# Patient Record
Sex: FEMALE | Race: WHITE | Employment: OTHER | URBAN - METROPOLITAN AREA
[De-identification: names, ages, dates, MRNs, and addresses within clinical notes are randomized per-mention and may not be internally consistent; named-entity substitution may affect disease eponyms.]

---

## 2017-03-21 ENCOUNTER — OFFICE VISIT (OUTPATIENT)
Dept: DERMATOLOGY | Facility: AMBULATORY SURGERY CENTER | Age: 82
End: 2017-03-21

## 2017-03-21 VITALS
OXYGEN SATURATION: 99 % | BODY MASS INDEX: 20.16 KG/M2 | DIASTOLIC BLOOD PRESSURE: 80 MMHG | HEIGHT: 65 IN | WEIGHT: 121 LBS | RESPIRATION RATE: 16 BRPM | TEMPERATURE: 97.6 F | HEART RATE: 64 BPM | SYSTOLIC BLOOD PRESSURE: 108 MMHG

## 2017-03-21 DIAGNOSIS — L57.0 ACTINIC KERATOSES: Primary | ICD-10-CM

## 2017-03-21 DIAGNOSIS — L82.0 SEBORRHEIC KERATOSES, INFLAMED: ICD-10-CM

## 2017-03-21 DIAGNOSIS — D18.01 CHERRY ANGIOMA: ICD-10-CM

## 2017-03-21 DIAGNOSIS — L82.1 SEBORRHEIC KERATOSES: ICD-10-CM

## 2017-03-21 NOTE — PROGRESS NOTES
Ms. Kolton Rojas comes in for follow-up. She is an 51-year-old lady with a history of actinic keratoses. She notices a few raised rough areas, 2 on her right cheek for which she has applied triamcinolone appointment without improvement. She is feeling well and in her usual state of health today. She has no pain, no current illnesses, no other skin concerns. Her allergies medications medical and social history are reviewed by me today. I have reviewed the history, physical exam, assessment and plan [facility] and agree. She is awake alert 51-year-old lady who appears well. I examined her scalp face ears neck chest back abdomen upper and lower extremities. She has actinic keratoses right supra brow, left lateral canthus, left clavicle. She has thicker seborrheic keratoses on her right cheek and right temple, the lesions she had noted. She has seborrheic keratoses, several inflamed on her extremities. Each diagnosis was discussed with her. Actinic keratoses were discussed and treatment was advised, she requested treatment for only the right brow and left clavicle today, and this was performed. She will schedule follow-up in the next few weeks for treatment of her left lateral canthus and the larger seborrheic keratoses on her right cheek and temple.
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
## 2017-03-21 NOTE — MR AVS SNAPSHOT
Visit Information Date & Time Provider Department Dept. Phone Encounter #  
 3/21/2017 10:30 AM Cindy Mendez NP [place] [address] [phone] [id number] Upcoming Health Maintenance Date Due ZOSTER VACCINE AGE 60> 7/12/1992 GLAUCOMA SCREENING Q2Y 7/12/1997 MEDICARE YEARLY EXAM 7/12/1997 Pneumococcal 65+ High/Highest Risk (2 of 2 - PPSV23) 11/5/2007 DTaP/Tdap/Td series (1 - Tdap) 9/15/2016 Allergies as of 3/21/2017  Review Complete On: 3/21/2017 By: Eusebio Bolden LPN Severity Noted Reaction Type Reactions Lemon  12/27/2011    Unknown (comments) Headache and close throat Novocain [Procaine]  09/05/2011    Anaphylaxis Caused her to faint Current Immunizations  Reviewed on 11/15/2013 Name Date Influenza High Dose Vaccine PF 9/14/2016 Influenza Vaccine 11/14/2013 Influenza Vaccine Split 11/3/2011 Pneumococcal Vaccine (Unspecified Type) 11/5/2002 Td, Adsorbed PF 9/14/2016 Not reviewed this visit Vitals BP Pulse Temp Resp Height(growth percentile) Weight(growth percentile) 108/80 (BP 1 Location: Right arm, BP Patient Position: Sitting) 64 97.6 °F (36.4 °C) (Oral) 16 5' 5\" (1.651 m) 121 lb (54.9 kg) SpO2 BMI OB Status Smoking Status 99% 20.14 kg/m2 Hysterectomy Former Smoker Vitals History BMI and BSA Data Body Mass Index Body Surface Area  
 20.14 kg/m 2 1.59 m 2 Preferred Pharmacy Pharmacy Name Phone [address], [address] [phone] Your Updated Medication List  
  
   
This list is accurate as of: 3/21/17 11:19 AM.  Always use your most recent med list.  
  
  
  
  
 Biotin 2,500 mcg Cap Take  by mouth. CENTRUM SILVER PO Take  by mouth. CLARITIN-D 12 HOUR 5-120 mg per tablet Generic drug:  loratadine-pseudoephedrine Take 1 Tab by mouth two (2) times daily as needed. diazePAM 2 mg tablet Commonly known as:  VALIUM Take  by mouth every six (6) hours as needed for Anxiety. levothyroxine 25 mcg tablet Commonly known as:  SYNTHROID  
TAKE 1 TABLET BY MOUTH ONCE DAILY. potassium chloride 10 mEq tablet Commonly known as:  K-DUR, KLOR-CON Take 1 Tab by mouth daily. PREMARIN 0.625 mg tablet Generic drug:  conjugated estrogens Take  by mouth.  
  
 triamcinolone acetonide 0.1 % ointment Commonly known as:  KENALOG Apply  to affected area two (2) times a day. use thin layer VITAMIN D3 2,000 unit Tab Generic drug:  cholecalciferol (vitamin D3) Take 1,000 Units by mouth. Introducing [facility]! Bela Hui introduces [employer] patient portal. Now you can access parts of your medical record, email your doctor's office, and request medication refills online. 1. In your internet browser, go to [email or web address] 2. Click on the First Time User? Click Here link in the Sign In box. You will see the New Member Sign Up page. 3. Enter your [employer] Access Code exactly as it appears below. You will not need to use this code after youve completed the sign-up process. If you do not sign up before the expiration date, you must request a new code. · [employer] Access Code: [email or web address] Expires: 6/19/2017 11:19 AM 
 
4. Enter the last four digits of your Social Security Number (xxxx) and Date of Birth (mm/dd/yyyy) as indicated and click Submit. You will be taken to the next sign-up page. 5. Create a G2 Web Servicest ID. This will be your [employer] login ID and cannot be changed, so think of one that is secure and easy to remember. 6. Create a Conn[employer] password. You can change your password at any time. 7. Enter your Password Reset Question and Answer. This can be used at a later time if you forget your password. 8. Enter your e-mail address.  You will receive e-mail notification when new information is available in [email or web address]. 9. Click Sign Up. You can now view and download portions of your medical record. 10. Click the Download Summary menu link to download a portable copy of your medical information. If you have questions, please visit the Frequently Asked Questions section of the [email or web address] website. Remember, [email or web address] is NOT to be used for urgent needs. For medical emergencies, dial 911. Now available from your iPhone and Android! Please provide this summary of care documentation to your next provider. Your primary care clinician is listed as Julián Henderson. If you have any questions after today's visit, please call [phone].

## 2017-05-17 ENCOUNTER — OFFICE VISIT (OUTPATIENT)
Dept: DERMATOLOGY | Facility: AMBULATORY SURGERY CENTER | Age: 82
End: 2017-05-17

## 2017-05-17 VITALS
SYSTOLIC BLOOD PRESSURE: 110 MMHG | HEART RATE: 65 BPM | TEMPERATURE: 97.4 F | BODY MASS INDEX: 20.16 KG/M2 | WEIGHT: 121 LBS | DIASTOLIC BLOOD PRESSURE: 74 MMHG | HEIGHT: 65 IN | OXYGEN SATURATION: 97 %

## 2017-05-17 DIAGNOSIS — L82.0 INFLAMED SEBORRHEIC KERATOSIS: ICD-10-CM

## 2017-05-17 DIAGNOSIS — L57.0 ACTINIC KERATOSIS: Primary | ICD-10-CM

## 2017-05-17 NOTE — PROGRESS NOTES
Written by Mejia Amezquita, as dictated by Spring Kraus, [address]. Name: Amarilys Steven       Age: 80 y.o. Date: 5/17/2017    Chief Complaint: No chief complaint on file. Subjective:    HPI:  Ms.. Amarilys Steven is a 80 y.o. female who presents today for treatment of residual inflamed seborrheic keratosis, treated at her last appointment on 3/21/2017, and an actinic keratosis on the left lateral canthus, also observed 3/21/2017. Ms. Amarilys Steven is feeling well and in her usual state of health today. ROS: Consitutional: Negative  Dermatological : positive for - Actinic keratosis; seborrheic keratosis      Social History     Social History    Marital status:      Spouse name: N/A    Number of children: N/A    Years of education: N/A     Occupational History    Not on file.      Social History Main Topics    Smoking status: Former Smoker     Packs/day: 1.00     Years: 20.00     Types: Cigarettes     Quit date: 12/27/1971    Smokeless tobacco: Never Used    Alcohol use 7.0 oz/week     10 Glasses of wine, 4 Standard drinks or equivalent per week    Drug use: No    Sexual activity: Not on file     Other Topics Concern    Not on file     Social History Narrative    ** Merged History Encounter **            Family History   Problem Relation Age of Onset    Stroke Mother     Heart Disease Father      heart attack in 66's       Past Medical History:   Diagnosis Date    Calculus of kidney     Cancer ([facility] [address]     bladder    Environmental allergies     History of actinic keratoses     Sun-damaged skin     Sunburn, blistering        Past Surgical History:   Procedure Laterality Date    HX APPENDECTOMY      HX BLADDER REPAIR      HX GYN      tahbso    HX HEENT      tonsillectomy    HX HYSTERECTOMY      HX ORTHOPAEDIC      partial right hip replacement        Current Outpatient Prescriptions   Medication Sig Dispense Refill    levothyroxine (SYNTHROID) 25 mcg tablet TAKE 1 TABLET BY MOUTH ONCE DAILY. 30 Tab PRN    triamcinolone acetonide (KENALOG) 0.1 % ointment Apply  to affected area two (2) times a day. use thin layer 30 g 0    Biotin 2,500 mcg cap Take  by mouth.  cholecalciferol, vitamin D3, (VITAMIN D3) 2,000 unit tab Take 1,000 Units by mouth.  FOLIC ACID/MULTIVITS-MIN/LUT (CENTRUM SILVER PO) Take  by mouth.  diazepam (VALIUM) 2 mg tablet Take  by mouth every six (6) hours as needed for Anxiety.  potassium chloride (K-DUR, KLOR-CON) 10 mEq tablet Take 1 Tab by mouth daily. 90 Tab 3    loratadine-pseudoephedrine (CLARITIN-D 12 HOUR) 5-120 mg per tablet Take 1 Tab by mouth two (2) times daily as needed.  conjugated estrogens (PREMARIN) 0.625 mg tablet Take  by mouth. Allergies   Allergen Reactions    Lemon Unknown (comments)     Headache and close throat      Novocain [Procaine] Anaphylaxis     Caused her to faint         Objective:    Visit Vitals    /74 (BP 1 Location: Left arm, BP Patient Position: Sitting)    Pulse 65    Temp 97.4 °F (36.3 °C) (Oral)    Ht 5' 5\" (1.651 m)    Wt 121 lb (54.9 kg)    SpO2 97%    BMI 20.14 kg/m2       Harry Hui is a 80 y.o. female who appears well and in no distress. She is awake, alert, and oriented. There is no preauricular, submandibular, or cervical lymphadenopathy. A limited skin examination was completed including her face. There is an actinic keratoses on her left lateral lower eyelid and partial resolution of an inflamed seborrheic keratosis on her right cheek x 2. Assessment/Plan:    1. Actinic Keratoses. The diagnosis of this precancerous lesion related to sun exposure was reviewed. Verbal consent was obtained. I treated 1 lesions with cryotherapy and post-cryotherapy care was reviewed. 2. Inflamed seborrheic keratoses. The diagnosis and treatment with liquid nitrogen cryotherapy were reviewed.   The risk or persistence or recurrence of the keratosis and the potential for pigment change at the treated site were reviewed. Verbal consent was obtained. I treated 2 lesions with cryotherapy and care was reviewed. This plan was reviewed with the patient and patient agrees. All questions were answered. This scribe documentation was reviewed by me and accurately reflects the examination and decisions made by me. [facility]   OFFICE PROCEDURE PROGRESS NOTE   Chart reviewed for the following:   Kofi CUMMINS, have reviewed the History, Physical and updated the Allergic reactions for Wyvonne Meherrin. TIME OUT performed immediately prior to start of procedure:   Tomeka CUMMINS, have performed the following reviews on Wyvonne Meherrin   prior to the start of the procedure:     * Patient was identified by name and date of birth   * Agreement on procedure being performed was verified   * Risks and Benefits explained to the patient   * Procedure site verified and marked as necessary   * Patient was positioned for comfort   * Verbal consent was obtained    Time: 1:08 PM  Date of procedure: 5/17/2017  Procedure performed by: Kenna Opitz.  Jose Maddox DNP  Provider assisted by: self  Patient assisted by: self   How tolerated by patient: tolerated the procedure well with no complications   Comments: none

## 2017-09-21 ENCOUNTER — OFFICE VISIT (OUTPATIENT)
Dept: DERMATOLOGY | Facility: AMBULATORY SURGERY CENTER | Age: 82
End: 2017-09-21

## 2017-09-21 VITALS
WEIGHT: 121.03 LBS | HEART RATE: 83 BPM | RESPIRATION RATE: 18 BRPM | SYSTOLIC BLOOD PRESSURE: 128 MMHG | BODY MASS INDEX: 20.17 KG/M2 | OXYGEN SATURATION: 94 % | DIASTOLIC BLOOD PRESSURE: 76 MMHG | HEIGHT: 65 IN | TEMPERATURE: 97.4 F

## 2017-09-21 DIAGNOSIS — D18.01 CHERRY ANGIOMA: ICD-10-CM

## 2017-09-21 DIAGNOSIS — L82.1 SEBORRHEIC KERATOSES: ICD-10-CM

## 2017-09-21 DIAGNOSIS — L57.0 ACTINIC KERATOSIS: Primary | ICD-10-CM

## 2017-09-21 DIAGNOSIS — S81.811A SKIN TEAR OF LOWER LEG WITHOUT COMPLICATION, RIGHT, INITIAL ENCOUNTER: ICD-10-CM

## 2017-09-21 RX ORDER — FLUOROURACIL 50 MG/G
CREAM TOPICAL
Qty: 40 G | Refills: 0 | Status: SHIPPED | OUTPATIENT
Start: 2017-09-21 | End: 2018-10-11 | Stop reason: ALTCHOICE

## 2017-09-21 NOTE — PROGRESS NOTES
Chief Complaint   Patient presents with    Skin Exam     3 moth check         1. Have you been to the ER, urgent care clinic since your last visit? Hospitalized since your last visit? Yes  Went to [facility] for fall down stairs in home Tuesday about dinner. 2. Have you seen or consulted any other health care providers outside of the [address] [place] [address] since your last visit? Include any pap smears or colon screening. No    Patient states no pain at the present, significant amount of skin removed from chin of right  Leg.

## 2017-09-21 NOTE — PROGRESS NOTES
Name: Akira Fabian       Age: 80 y.o. Date: 9/21/2017    Chief Complaint:   Chief Complaint   Patient presents with    Skin Exam     3 moth check       Subjective:    HPI  Ms. Akira Fabian is a 80 y.o. female who presents for a full skin exam.  The patient's last skin exam was on 5/17/17 and the patient does have current complaints related to her skin. She states she fell on Monday causing a skin tear to the right shin. She went to the emergency room and was bandaged and has been suggested to manage this every day with antibiotic ointment, nonstick gauze and a wrap. She would like our opinion on the way to make this feel the best and fastest.  She also notes some scaly pink lesions on each upper arm that are persistent. The patient's pertinent skin history includes : Personal history of actinic keratoses    ROS: Constitutional: Negative. Dermatological : positive for - skin lesion changes      Social History     Social History    Marital status:      Spouse name: N/A    Number of children: N/A    Years of education: N/A     Occupational History    Not on file.      Social History Main Topics    Smoking status: Former Smoker     Packs/day: 1.00     Years: 20.00     Types: Cigarettes     Quit date: 12/27/1971    Smokeless tobacco: Never Used    Alcohol use 7.0 oz/week     10 Glasses of wine, 4 Standard drinks or equivalent per week    Drug use: No    Sexual activity: Not on file     Other Topics Concern    Not on file     Social History Narrative    ** Merged History Encounter **            Family History   Problem Relation Age of Onset    Stroke Mother     Heart Disease Father      heart attack in 66's       Past Medical History:   Diagnosis Date    Calculus of kidney     Cancer ([facility] [address]     bladder    Environmental allergies     History of actinic keratoses     Sun-damaged skin     Sunburn, blistering        Past Surgical History:   Procedure Laterality Date    HX APPENDECTOMY      HX BLADDER REPAIR      HX GYN      tahbso    HX HEENT      tonsillectomy    HX HYSTERECTOMY      HX ORTHOPAEDIC      partial right hip replacement        Current Outpatient Prescriptions   Medication Sig Dispense Refill    fluorouracil (EFUDEX) 5 % chemo cream Apply a thin layer to the pink areas on each upper arm twice daily for 4 weeks. 40 g 0    PREMARIN 0.625 mg tablet TAKE (1) TABLET DAILY. 30 Tab 0    potassium chloride SR (KLOR-CON 10) 10 mEq tablet TAKE 1 TABLET BY MOUTH EVERY DAY. 30 Tab 5    levothyroxine (SYNTHROID) 25 mcg tablet TAKE 1 TABLET BY MOUTH ONCE DAILY. 30 Tab PRN    triamcinolone acetonide (KENALOG) 0.1 % ointment Apply  to affected area two (2) times a day. use thin layer 30 g 0    Biotin 2,500 mcg cap Take  by mouth.  cholecalciferol, vitamin D3, (VITAMIN D3) 2,000 unit tab Take 1,000 Units by mouth.  potassium chloride (K-DUR, KLOR-CON) 10 mEq tablet Take 1 Tab by mouth daily. 90 Tab 3    loratadine-pseudoephedrine (CLARITIN-D 12 HOUR) 5-120 mg per tablet Take 1 Tab by mouth two (2) times daily as needed.  FOLIC ACID/MULTIVITS-MIN/LUT (CENTRUM SILVER PO) Take  by mouth.  diazepam (VALIUM) 2 mg tablet Take  by mouth every six (6) hours as needed for Anxiety. Allergies   Allergen Reactions    Lemon Unknown (comments)     Headache and close throat      Novocain [Procaine] Anaphylaxis     Caused her to faint         Objective:    Visit Vitals    /76 (BP 1 Location: Left arm, BP Patient Position: Sitting)    Pulse 83    Temp 97.4 °F (36.3 °C) (Oral)    Resp 18    Ht 5' 5\" (1.651 m)    Wt 54.9 kg (121 lb 0.5 oz)    SpO2 94%    BMI 20.14 kg/m2       Akira Fabian is a 80 y.o. female who appears well and in no distress. She is awake, alert, and oriented. There is no preauricular, submandibular, or cervical lymphadenopathy.   A skin examination was performed including her scalp, face (including eyelids), ears, neck, chest, back, abdomen, upper extremities (including digits/nails), lower extremities-anterior, breasts. there are lentigines on sun exposed areas. She has an old Traumatic scar on the right side of her face. There is thin skin actinic keratosis on the nose. There are pink scaly patches ×2 on the right arm at ×3 in the left upper arm, concerning for actinic keratosis versus squamous cell carcinoma in situ. There is a large skin tear on the right anterior shin. There is mild active bleeding. No surrounding erythema to suggest cellulitis. She scattered cherry angiomas and seborrheic keratosis. Assessment/Plan:  1. 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 We discussed the diagnosis differentials and options for therapy including cryotherapy, Efudex, and biopsy. She last used on each of these areas for. 4 weeks. We discussed the patient needs to use twice daily and some of the side effects. She was encouraged to call if she has any trouble during these. 2.Actinic Keratoses. The diagnosis of this precancerous lesion related to sun exposure was reviewed. Verbal consent was obtained. I treated 1 lesions with cryotherapy and post-cryotherapy care was reviewed. [facility]   OFFICE PROCEDURE PROGRESS NOTE   Chart reviewed for the following:   Kofi CUMMINS, have reviewed the History, Physical and updated the Allergic reactions for Don Worthington. TIME OUT performed immediately prior to start of procedure:   Tomeka CUMMINS, have performed the following reviews on Don Worthington   prior to the start of the procedure:     * Patient was identified by name and date of birth   * Agreement on procedure being performed was verified   * Risks and Benefits explained to the patient   * Procedure site verified and marked as necessary   * Patient was positioned for comfort   * Verbal consent was obtained    Time: 1130  Date of procedure: 9/21/2017  Procedure performed by: Marcos Reyna. Delroy Talbert DNP  Provider assisted by: none   Patient assisted by: self   How tolerated by patient: tolerated the procedure well with no complications   Comments: none    3. Seborrheic keratoses. The diagnosis was reviewed and the patient was reassured that no treatment is needed for these benign lesions. 4. Cherry angiomas. The diagnosis was reviewed and the patient was reassured that no treatment is needed for these benign lesions. 5. Skin tear right anterior shin. Diagnosis discussed. We performed wound care today with Xeroform and bandage. She will return in 1 week for bandage change.

## 2017-09-25 ENCOUNTER — OFFICE VISIT (OUTPATIENT)
Dept: DERMATOLOGY | Facility: AMBULATORY SURGERY CENTER | Age: 82
End: 2017-09-25

## 2017-09-25 DIAGNOSIS — S90.121A: ICD-10-CM

## 2017-09-25 DIAGNOSIS — Z48.00 DRESSING CHANGE: Primary | ICD-10-CM

## 2017-09-25 DIAGNOSIS — S51.011D SKIN TEAR OF ELBOW WITHOUT COMPLICATION, RIGHT, SUBSEQUENT ENCOUNTER: ICD-10-CM

## 2017-09-25 NOTE — PROGRESS NOTES
S: Here for bandage change. Decided to come a little early if she noticed that her toes were turning purple. She denies any pain in the toes and states the pain in the shin itself with a skin tear was has been slowly improving. ROS: no fevers, minimal pain, no redness    O: There is a large skin tear on the right anterior shin with no surrounding redness to suggest infection  an no inflammation. There is healthy granulation tissue. She has bruising on the second toe without swelling. There is no tenderness on palpation or range of motion. No other ecchymosis is noted on the foot. Minimal swelling of the foot is noted. A/P: 1- Skin tear right shin  Bandage changed for 1 more weeks duration today. Doing well and no infection noted. 2- Bruising of the 2nd toe. The patient reassured.

## 2017-10-02 ENCOUNTER — OFFICE VISIT (OUTPATIENT)
Dept: DERMATOLOGY | Facility: AMBULATORY SURGERY CENTER | Age: 82
End: 2017-10-02

## 2017-10-02 DIAGNOSIS — S81.819D: Primary | ICD-10-CM

## 2017-10-02 DIAGNOSIS — Z48.00 ENCOUNTER FOR CHANGE OR REMOVAL OF NONSURGICAL WOUND DRESSING: Primary | ICD-10-CM

## 2017-10-02 NOTE — PROGRESS NOTES
Ms. Lopez Feeling comes in for a bandage change on the skin tear on her right shin. The bandage has not been tight but she does feel some discomfort in the skin, this has increased and is not present today. The dressing has been dry and intact since last week. Her caregiver is with her. She is feeling well and in her usual state of health today. She has no pain, no current illnesses, no other skin concerns. Her allergies medications medical and social history are reviewed by me today. She is awake alert lady who appears well. The dressing is clean and was removed. She has a well-healing skin tear which involves the majority for anterior shin. The wound is clean, there is virtually no edema or erythema surrounding, no tenderness, no evidence of infection, and healthy granulation tissue at the base of the wound. She is healing very well, I am pleased to see the weekly progress. She prefers to return here for her dressing changes and will return in 1 week.

## 2017-10-02 NOTE — PROGRESS NOTES
Name: Dinora Barker       Age: 80 y.o. Date: 10/2/2017    Chief Complaint: had concerns including Wound Check. Subjective:    HPI  Ms. Dinora Barker is a 80 y.o. female who presents for a follow up from a skin tear on the right lower leg. She states the pain has improved. ROS: Constitutional: Negative  Dermatological : positive for - healing skin tear      Social History     Social History    Marital status:      Spouse name: N/A    Number of children: N/A    Years of education: N/A     Occupational History    Not on file. Social History Main Topics    Smoking status: Former Smoker     Packs/day: 1.00     Years: 20.00     Types: Cigarettes     Quit date: 12/27/1971    Smokeless tobacco: Never Used    Alcohol use 7.0 oz/week     10 Glasses of wine, 4 Standard drinks or equivalent per week    Drug use: No    Sexual activity: Not on file     Other Topics Concern    Not on file     Social History Narrative    ** Merged History Encounter **            Family History   Problem Relation Age of Onset    Stroke Mother     Heart Disease Father      heart attack in 66's       Past Medical History:   Diagnosis Date    Calculus of kidney     Cancer ([facility] [address]     bladder    Environmental allergies     History of actinic keratoses     Sun-damaged skin     Sunburn, blistering        Past Surgical History:   Procedure Laterality Date    HX APPENDECTOMY      HX BLADDER REPAIR      HX GYN      tahbso    HX HEENT      tonsillectomy    HX HYSTERECTOMY      HX ORTHOPAEDIC      partial right hip replacement        Current Outpatient Prescriptions   Medication Sig Dispense Refill    fluorouracil (EFUDEX) 5 % chemo cream Apply a thin layer to the pink areas on each upper arm twice daily for 4 weeks. 40 g 0    PREMARIN 0.625 mg tablet TAKE (1) TABLET DAILY. 30 Tab 0    potassium chloride SR (KLOR-CON 10) 10 mEq tablet TAKE 1 TABLET BY MOUTH EVERY DAY.  30 Tab 5    levothyroxine (SYNTHROID) 25 mcg tablet TAKE 1 TABLET BY MOUTH ONCE DAILY. 30 Tab PRN    triamcinolone acetonide (KENALOG) 0.1 % ointment Apply  to affected area two (2) times a day. use thin layer 30 g 0    Biotin 2,500 mcg cap Take  by mouth.  cholecalciferol, vitamin D3, (VITAMIN D3) 2,000 unit tab Take 1,000 Units by mouth.  FOLIC ACID/MULTIVITS-MIN/LUT (CENTRUM SILVER PO) Take  by mouth.  diazepam (VALIUM) 2 mg tablet Take  by mouth every six (6) hours as needed for Anxiety.  potassium chloride (K-DUR, KLOR-CON) 10 mEq tablet Take 1 Tab by mouth daily. 90 Tab 3    loratadine-pseudoephedrine (CLARITIN-D 12 HOUR) 5-120 mg per tablet Take 1 Tab by mouth two (2) times daily as needed. Allergies   Allergen Reactions    Lemon Unknown (comments)     Headache and close throat      Novocain [Procaine] Anaphylaxis     Caused her to faint         Objective: There were no vitals taken for this visit. Анна Castaneda is a 80 y.o. female who appears well and in no distress. She is awake, alert, and oriented. A limited examination of the right lower leg was completed. There is healthy granulation tissue, no inflammation or infection. Assessment/Plan:  1. Follow up from skin tear on right shin. Dressing changed again by nursing. Patient reassured.

## 2017-10-09 ENCOUNTER — OFFICE VISIT (OUTPATIENT)
Dept: DERMATOLOGY | Facility: AMBULATORY SURGERY CENTER | Age: 82
End: 2017-10-09

## 2017-10-09 VITALS
WEIGHT: 121 LBS | HEART RATE: 69 BPM | OXYGEN SATURATION: 96 % | HEIGHT: 65 IN | DIASTOLIC BLOOD PRESSURE: 80 MMHG | SYSTOLIC BLOOD PRESSURE: 140 MMHG | BODY MASS INDEX: 20.16 KG/M2 | TEMPERATURE: 96 F

## 2017-10-09 DIAGNOSIS — Z48.00 ENCOUNTER FOR CHANGE OR REMOVAL OF NONSURGICAL WOUND DRESSING: ICD-10-CM

## 2017-10-09 DIAGNOSIS — S51.011D SKIN TEAR OF ELBOW WITHOUT COMPLICATION, RIGHT, SUBSEQUENT ENCOUNTER: Primary | ICD-10-CM

## 2017-10-09 NOTE — PROGRESS NOTES
Chief Complaint   Patient presents with    Skin Exam     Pt seen today for wound check only. Vitals only taken.     Visit Vitals    /80 (BP 1 Location: Left arm, BP Patient Position: Sitting)    Pulse 69    Temp 96 °F (35.6 °C) (Oral)    Ht 5' 5\" (1.651 m)    Wt 121 lb (54.9 kg)    SpO2 96%    BMI 20.14 kg/m2

## 2017-10-09 NOTE — PROGRESS NOTES
Wound check/suture removal:    Chief complaint: wound check. HPI: Desmond Rajan presents for wound check following a skin tear. Exam: The right shin was examined. There is not evidence of infection. There is no erythema/ inflammation. There is healthy granulation tissue and beginning of re-epithelialization. A/P:  Wound check. Progressing as expected. Debridement of dead tissue done by me today. I would suggest some air to prevent over growth of the granulation tissue. She understands. Dressing changes will be done at home every 3rd day with showering, gentle scrubbing and re-dressing after 1-2 hours of air. Recheck - hopefully for the last exam in 1 week.

## 2017-10-16 ENCOUNTER — OFFICE VISIT (OUTPATIENT)
Dept: DERMATOLOGY | Facility: AMBULATORY SURGERY CENTER | Age: 82
End: 2017-10-16

## 2017-10-16 VITALS
HEIGHT: 65 IN | OXYGEN SATURATION: 94 % | RESPIRATION RATE: 18 BRPM | BODY MASS INDEX: 20.16 KG/M2 | HEART RATE: 70 BPM | DIASTOLIC BLOOD PRESSURE: 88 MMHG | WEIGHT: 121 LBS | SYSTOLIC BLOOD PRESSURE: 120 MMHG | TEMPERATURE: 97.6 F

## 2017-10-16 DIAGNOSIS — S51.011S: Primary | ICD-10-CM

## 2017-10-16 NOTE — MR AVS SNAPSHOT
Visit Information Date & Time Provider Department Dept. Phone Encounter #  
 10/16/2017 11:15 AM [facility] [id number] [phone] [id number] Upcoming Health Maintenance Date Due COLONOSCOPY 7/12/1950 GLAUCOMA SCREENING Q2Y 7/12/1997 MEDICARE YEARLY EXAM 7/12/1997 Pneumococcal 65+ High/Highest Risk (2 of 2 - PPSV23) 11/5/2007 DTaP/Tdap/Td series (1 - Tdap) 9/15/2016 INFLUENZA AGE 9 TO ADULT 8/1/2017 Allergies as of 10/16/2017  Review Complete On: 10/16/2017 By: Salvador Link Severity Noted Reaction Type Reactions Lemon  12/27/2011    Unknown (comments) Headache and close throat Novocain [Procaine]  09/05/2011    Anaphylaxis Caused her to faint Current Immunizations  Reviewed on 11/15/2013 Name Date Influenza High Dose Vaccine PF 9/14/2016 Influenza Vaccine 11/14/2013 Influenza Vaccine Split 11/3/2011 Td, Adsorbed PF 9/14/2016 ZZZ-RETIRED (DO NOT USE) Pneumococcal Vaccine (Unspecified Type) 11/5/2002 Not reviewed this visit Vitals BP Pulse Temp Resp Height(growth percentile) Weight(growth percentile) 120/88 (BP 1 Location: Left arm, BP Patient Position: Sitting) 70 97.6 °F (36.4 °C) (Oral) 18 5' 5\" (1.651 m) 121 lb (54.9 kg) SpO2 BMI OB Status Smoking Status 94% 20.14 kg/m2 Hysterectomy Former Smoker BMI and BSA Data Body Mass Index Body Surface Area  
 20.14 kg/m 2 1.59 m 2 Preferred Pharmacy Pharmacy Name Phone [address], [address] [facility] [phone] Your Updated Medication List  
  
   
This list is accurate as of: 10/16/17 11:23 AM.  Always use your most recent med list.  
  
  
  
  
 Biotin 2,500 mcg Cap Take  by mouth. CENTRUM SILVER PO Take  by mouth. CLARITIN-D 12 HOUR 5-120 mg per tablet Generic drug:  loratadine-pseudoephedrine Take 1 Tab by mouth two (2) times daily as needed. diazePAM 2 mg tablet Commonly known as:  VALIUM Take  by mouth every six (6) hours as needed for Anxiety. fluorouracil 5 % chemo cream  
Commonly known as:  EFUDEX Apply a thin layer to the pink areas on each upper arm twice daily for 4 weeks. levothyroxine 25 mcg tablet Commonly known as:  SYNTHROID  
TAKE 1 TABLET BY MOUTH ONCE DAILY. * potassium chloride 10 mEq tablet Commonly known as:  KLOR-CON Take 1 Tab by mouth daily. * potassium chloride SR 10 mEq tablet Commonly known as:  KLOR-CON 10  
TAKE 1 TABLET BY MOUTH EVERY DAY. PREMARIN 0.625 mg tablet Generic drug:  conjugated estrogens TAKE (1) TABLET DAILY. triamcinolone acetonide 0.1 % ointment Commonly known as:  KENALOG Apply  to affected area two (2) times a day. use thin layer VITAMIN D3 2,000 unit Tab Generic drug:  cholecalciferol (vitamin D3) Take 1,000 Units by mouth. * Notice: This list has 2 medication(s) that are the same as other medications prescribed for you. Read the directions carefully, and ask your doctor or other care provider to review them with you. Patient Instructions Self Skin Exam and Sunscreens Early detection and treatment is essential in the treatment of all forms of skin cancer. If caught early, all forms of skin cancer are curable. In addition to your regular visits, you should perform a monthly skin examination. Over time, you become familiar with what is normally found on your skin and can identify new or suspicious spots. One of the screening tools you can use to assess your skin is to follow the ABCDEs: 
 
A= Asymmetry (One half is unlike the other half) B= Border (An irregular, scalloped or poorly defined edge) C= Color (Is varied from one area to another, has shades of tan, brown/ black,       white, red or blue) D= Diameter (Spots larger than 6mm or a pencil eraser) E= Evolving (New spots or one that is changing in size, shape, or color) A follow- up interval will be customized based on your history of skin cancer or level of skin damage and risk factors. In any case, if you notice a suspicious or new spot, an appointment should be arranged between regular visits. Everyone should use sunscreen and sun-safe practices, which is especially important for those with a personal or family history of skin cancer. Suggestions for this include: 1. Use daily moisturizers containing SPF 30 or higher. 2. Wear long sleeve clothing with UPF ratings and a broad-brimmed hat. 3. Apply sunscreen with SPF 30 or higher to all sun exposed areas if you are going to be in the sun. A broad spectrum UVA/ UVB sunscreen is best.  Dont forget to REAPPLY every two hours or more often if swimming or sweating! 4. Avoid outside activities during peak sun hours, especially in the summer (10am- 2pm). 5. DO NOT use tanning beds. Using sunscreen and sun-safe practices can help reduce the likelihood of developing skin cancer or additional skin cancers in those previously diagnosed. Introducing [address] [facility] introduces [employer] patient portal. Now you can access parts of your medical record, email your doctor's office, and request medication refills online. 1. In your internet browser, go to [email or web address] 2. Click on the First Time User? Click Here link in the Sign In box. You will see the New Member Sign Up page. 3. Enter your [employer] Access Code exactly as it appears below. You will not need to use this code after youve completed the sign-up process. If you do not sign up before the expiration date, you must request a new code. · [employer] Access Code: [email or web address] Expires: 1/7/2018 12:56 PM 
 
4.  Enter the last four digits of your Social Security Number (xxxx) and Date of Birth (mm/dd/yyyy) as indicated and click Submit. You will be taken to the next sign-up page. 5. Create a [employer] ID. This will be your [employer] login ID and cannot be changed, so think of one that is secure and easy to remember. 6. Create a [employer] password. You can change your password at any time. 7. Enter your Password Reset Question and Answer. This can be used at a later time if you forget your password. 8. Enter your e-mail address. You will receive e-mail notification when new information is available in [address] 9. Click Sign Up. You can now view and download portions of your medical record. 10. Click the Download Summary menu link to download a portable copy of your medical information. If you have questions, please visit the Frequently Asked Questions section of the [employer] website. Remember, [employer] is NOT to be used for urgent needs. For medical emergencies, dial 911. Now available from your iPhone and Android! Please provide this summary of care documentation to your next provider. Your primary care clinician is listed as Shayna Rojas. If you have any questions after today's visit, please call [phone].

## 2017-10-16 NOTE — PROGRESS NOTES
Name: Shanice Ndiaye       Age: 80 y.o. Date: 10/16/2017    Chief Complaint: had concerns including Skin Exam.    Subjective:    HPI  Ms. Shanice Ndiaye is a 80 y.o. female who presents for a follow up from skin tear on the right shin. The patient has not had problems such as pain or bleeding since the last visit. The patient does not have any current concerns. She last changed the dressing on Friday. She states this has been going okay. ROS: Constitutional: Negative  Dermatological : positive for - healing skin tear      Social History     Social History    Marital status:      Spouse name: N/A    Number of children: N/A    Years of education: N/A     Occupational History    Not on file. Social History Main Topics    Smoking status: Former Smoker     Packs/day: 1.00     Years: 20.00     Types: Cigarettes     Quit date: 12/27/1971    Smokeless tobacco: Never Used    Alcohol use 7.0 oz/week     10 Glasses of wine, 4 Standard drinks or equivalent per week    Drug use: No    Sexual activity: Not on file     Other Topics Concern    Not on file     Social History Narrative    ** Merged History Encounter **            Family History   Problem Relation Age of Onset    Stroke Mother     Heart Disease Father      heart attack in 66's       Past Medical History:   Diagnosis Date    Calculus of kidney     Cancer ([facility] [address]     bladder    Environmental allergies     History of actinic keratoses     Sun-damaged skin     Sunburn, blistering        Past Surgical History:   Procedure Laterality Date    HX APPENDECTOMY      HX BLADDER REPAIR      HX GYN      tahbso    HX HEENT      tonsillectomy    HX HYSTERECTOMY      HX ORTHOPAEDIC      partial right hip replacement        Current Outpatient Prescriptions   Medication Sig Dispense Refill    fluorouracil (EFUDEX) 5 % chemo cream Apply a thin layer to the pink areas on each upper arm twice daily for 4 weeks.  40 g 0    PREMARIN 0.625 mg tablet TAKE (1) TABLET DAILY. 30 Tab 0    potassium chloride SR (KLOR-CON 10) 10 mEq tablet TAKE 1 TABLET BY MOUTH EVERY DAY. 30 Tab 5    levothyroxine (SYNTHROID) 25 mcg tablet TAKE 1 TABLET BY MOUTH ONCE DAILY. 30 Tab PRN    triamcinolone acetonide (KENALOG) 0.1 % ointment Apply  to affected area two (2) times a day. use thin layer 30 g 0    Biotin 2,500 mcg cap Take  by mouth.  cholecalciferol, vitamin D3, (VITAMIN D3) 2,000 unit tab Take 1,000 Units by mouth.  FOLIC ACID/MULTIVITS-MIN/LUT (CENTRUM SILVER PO) Take  by mouth.  diazepam (VALIUM) 2 mg tablet Take  by mouth every six (6) hours as needed for Anxiety.  potassium chloride (K-DUR, KLOR-CON) 10 mEq tablet Take 1 Tab by mouth daily. 90 Tab 3    loratadine-pseudoephedrine (CLARITIN-D 12 HOUR) 5-120 mg per tablet Take 1 Tab by mouth two (2) times daily as needed. Allergies   Allergen Reactions    Lemon Unknown (comments)     Headache and close throat      Novocain [Procaine] Anaphylaxis     Caused her to faint         Objective:    Visit Vitals    /88 (BP 1 Location: Left arm, BP Patient Position: Sitting)    Pulse 70    Temp 97.6 °F (36.4 °C) (Oral)    Resp 18    Ht 5' 5\" (1.651 m)    Wt 54.9 kg (121 lb)    SpO2 94%    BMI 20.14 kg/m2       Zach Miranda is a 80 y.o. female who appears well and in no distress. She is awake, alert, and oriented. A limited examination of the right shin was completed. There is no inflammation. There has be re-epithelization of skin apparent from last visit. There is a mild odor today. Assessment/Plan:  1. Follow up from skin tear on right shin. She will start changing this daily and showering. Cleanse with soap and water and apply vaseline. Coverage may be done with a tall white cotton sock.

## 2017-10-16 NOTE — PROGRESS NOTES
Chief Complaint   Patient presents with    Skin Exam       Pt here today for a surgical follow up. 1. Have you been to the ER, urgent care clinic since your last visit? Hospitalized since your last visit? No    2. Have you seen or consulted any other health care providers outside of the [facility] since your last visit? Include any pap smears or colon screening.  No

## 2017-12-20 ENCOUNTER — OFFICE VISIT (OUTPATIENT)
Dept: DERMATOLOGY | Facility: AMBULATORY SURGERY CENTER | Age: 82
End: 2017-12-20

## 2017-12-20 ENCOUNTER — HOSPITAL ENCOUNTER (OUTPATIENT)
Dept: LAB | Age: 82
Discharge: HOME | End: 2017-12-20

## 2017-12-20 VITALS
WEIGHT: 121 LBS | DIASTOLIC BLOOD PRESSURE: 78 MMHG | SYSTOLIC BLOOD PRESSURE: 140 MMHG | BODY MASS INDEX: 20.16 KG/M2 | HEIGHT: 65 IN | OXYGEN SATURATION: 96 % | TEMPERATURE: 97.6 F | HEART RATE: 84 BPM

## 2017-12-20 DIAGNOSIS — S81.812A SKIN TEAR OF LEFT LOWER LEG WITHOUT COMPLICATION, INITIAL ENCOUNTER: ICD-10-CM

## 2017-12-20 DIAGNOSIS — D48.5 NEOPLASM OF UNCERTAIN BEHAVIOR OF SKIN OF LOWER LEG: Primary | ICD-10-CM

## 2017-12-20 RX ORDER — LATANOPROST 50 UG/ML
1 SOLUTION/ DROPS OPHTHALMIC
COMMUNITY
Start: 2017-09-18 | End: 2022-04-07

## 2017-12-20 NOTE — MR AVS SNAPSHOT
Visit Information Date & Time Provider Department Dept. Phone Encounter #  
 12/20/2017  2:30 PM Bc Sarabia NP Ana Paula 8057 [phone] [id number] Upcoming Health Maintenance Date Due COLONOSCOPY 7/12/1950 GLAUCOMA SCREENING Q2Y 7/12/1997 MEDICARE YEARLY EXAM 7/12/1997 Pneumococcal 65+ High/Highest Risk (2 of 2 - PPSV23) 11/5/2007 DTaP/Tdap/Td series (1 - Tdap) 9/15/2016 Influenza Age 5 to Adult 8/1/2017 Allergies as of 12/20/2017  Review Complete On: 12/20/2017 By: Vince Gates Severity Noted Reaction Type Reactions Lemon  12/27/2011    Unknown (comments) Headache and close throat Novocain [Procaine]  09/05/2011    Anaphylaxis Caused her to faint Current Immunizations  Reviewed on 11/15/2013 Name Date Influenza High Dose Vaccine PF 9/14/2016 Influenza Vaccine 11/14/2013 Influenza Vaccine Split 11/3/2011 Td, Adsorbed PF 9/14/2016 ZZZ-RETIRED (DO NOT USE) Pneumococcal Vaccine (Unspecified Type) 11/5/2002 Not reviewed this visit Vitals BP Pulse Temp Height(growth percentile) Weight(growth percentile) SpO2  
 140/78 (BP 1 Location: Left arm, BP Patient Position: Sitting) 84 97.6 °F (36.4 °C) (Oral) 5' 5\" (1.651 m) 121 lb (54.9 kg) 96% BMI OB Status Smoking Status 20.14 kg/m2 Hysterectomy Former Smoker BMI and BSA Data Body Mass Index Body Surface Area  
 20.14 kg/m 2 1.59 m 2 Preferred Pharmacy Pharmacy Name Phone [address], [address] [phone] Your Updated Medication List  
  
   
This list is accurate as of: 12/20/17  2:37 PM.  Always use your most recent med list.  
  
  
  
  
 Biotin 2,500 mcg Cap Take  by mouth. CENTRUM SILVER PO Take  by mouth. CLARITIN-D 12 HOUR 5-120 mg per tablet Generic drug:  loratadine-pseudoephedrine Take 1 Tab by mouth two (2) times daily as needed. conjugated estrogens 0.625 mg tablet Commonly known as:  PREMARIN  
TAKE (1) TABLET DAILY. diazePAM 2 mg tablet Commonly known as:  VALIUM Take  by mouth every six (6) hours as needed for Anxiety. fluorouracil 5 % chemo cream  
Commonly known as:  EFUDEX Apply a thin layer to the pink areas on each upper arm twice daily for 4 weeks. latanoprost 0.005 % ophthalmic solution Commonly known as:  XALATAN  
  
 levothyroxine 25 mcg tablet Commonly known as:  SYNTHROID  
TAKE 1 TABLET BY MOUTH ONCE DAILY. potassium chloride 10 mEq tablet Commonly known as:  KLOR-CON Take 1 Tab by mouth daily. triamcinolone acetonide 0.1 % ointment Commonly known as:  KENALOG Apply  to affected area two (2) times a day. use thin layer VITAMIN D3 2,000 unit Tab Generic drug:  cholecalciferol (vitamin D3) Take 1,000 Units by mouth. Patient Instructions Self Skin Exam and Sunscreens Early detection and treatment is essential in the treatment of all forms of skin cancer. If caught early, all forms of skin cancer are curable. In addition to your regular visits, you should perform a monthly skin examination. Over time, you become familiar with what is normally found on your skin and can identify new or suspicious spots. One of the screening tools you can use to assess your skin is to follow the ABCDEs: 
 
A= Asymmetry (One half is unlike the other half) B= Border (An irregular, scalloped or poorly defined edge) C= Color (Is varied from one area to another, has shades of tan, brown/ black,       white, red or blue) D= Diameter (Spots larger than 6mm or a pencil eraser) E= Evolving (New spots or one that is changing in size, shape, or color) A follow- up interval will be customized based on your history of skin cancer or level of skin damage and risk factors.   In any case, if you notice a suspicious or new spot, an appointment should be arranged between regular visits. Everyone should use sunscreen and sun-safe practices, which is especially important for those with a personal or family history of skin cancer. Suggestions for this include: 1. Use daily moisturizers containing SPF 30 or higher. 2. Wear long sleeve clothing with UPF ratings and a broad-brimmed hat. 3. Apply sunscreen with SPF 30 or higher to all sun exposed areas if you are going to be in the sun. A broad spectrum UVA/ UVB sunscreen is best.  Dont forget to REAPPLY every two hours or more often if swimming or sweating! 4. Avoid outside activities during peak sun hours, especially in the summer (10am- 2pm). 5. DO NOT use tanning beds. Using sunscreen and sun-safe practices can help reduce the likelihood of developing skin cancer or additional skin cancers in those previously diagnosed. Introducing \[facility]\"" & HEALTH SERVICES! Yaneth Monroe introduces [employer] patient portal. Now you can access parts of your medical record, email your doctor's office, and request medication refills online. 1. In your internet browser, go to [email or web address] 2. Click on the First Time User? Click Here link in the Sign In box. You will see the New Member Sign Up page. 3. Enter your [employer] Access Code exactly as it appears below. You will not need to use this code after youve completed the sign-up process. If you do not sign up before the expiration date, you must request a new code. · [employer] Access Code: [email or web address] Expires: 1/7/2018 11:56 AM 
 
4. Enter the last four digits of your Social Security Number (xxxx) and Date of Birth (mm/dd/yyyy) as indicated and click Submit. You will be taken to the next sign-up page. 5. Create a [employer] ID. This will be your [employer] login ID and cannot be changed, so think of one that is secure and easy to remember. 6. Create a Easy Tempo password. You can change your password at any time. 7. Enter your Password Reset Question and Answer. This can be used at a later time if you forget your password. 8. Enter your e-mail address. You will receive e-mail notification when new information is available in [address] 9. Click Sign Up. You can now view and download portions of your medical record. 10. Click the Download Summary menu link to download a portable copy of your medical information. If you have questions, please visit the Frequently Asked Questions section of the [employer] website. Remember, [employer] is NOT to be used for urgent needs. For medical emergencies, dial 911. Now available from your iPhone and Android! Please provide this summary of care documentation to your next provider. Your primary care clinician is listed as Angelia Cha. If you have any questions after today's visit, please call [phone].

## 2017-12-20 NOTE — PROGRESS NOTES
Chief Complaint   Patient presents with    Skin Exam     1. Have you been to the ER, urgent care clinic since your last visit? Hospitalized since your last visit? No    2. Have you seen or consulted any other health care providers outside of the [address] [place] [address] since your last visit? Include any pap smears or colon screening.  No

## 2017-12-26 NOTE — PROGRESS NOTES
I spoke with the patient and she is aware of the diagnosis of SCC. She understands I suggest Mohs. Appt options for Thursday (12/28) or Monday (1/22/2018) were given - she would like to take this Thursday as she will be going to [place] on 1/15/18. Mohs surgery procedure was discussed. She states other than her leg being mildly sore she is doing well.

## 2017-12-28 ENCOUNTER — OFFICE VISIT (OUTPATIENT)
Dept: DERMATOLOGY | Facility: AMBULATORY SURGERY CENTER | Age: 82
End: 2017-12-28

## 2017-12-28 VITALS
SYSTOLIC BLOOD PRESSURE: 130 MMHG | TEMPERATURE: 97.6 F | BODY MASS INDEX: 20.17 KG/M2 | WEIGHT: 121.03 LBS | DIASTOLIC BLOOD PRESSURE: 70 MMHG | OXYGEN SATURATION: 97 % | RESPIRATION RATE: 18 BRPM | HEART RATE: 69 BPM | HEIGHT: 65 IN

## 2017-12-28 DIAGNOSIS — C44.729 SQUAMOUS CELL CARCINOMA OF LEFT LOWER LEG: Primary | ICD-10-CM

## 2017-12-28 NOTE — PATIENT INSTRUCTIONS
WOUND CARE INSTRUCTIONS    1. Keep the dressing clean and dry and do not remove for 48 hours. 2. Then change the dressing once a day as follows:  a. Wash hands before and after each dressing change. b. Remove dressing and wash site gently with mild soap and water, rinse, and pat dry.  c. Apply an ointment (Bacitracin, Polysporin, Neosporin, Petroleum jelly or Aquaphor). d. Apply a non-stick (Telfa) dressing or Band-Aid to cover the wound. Remove pressure bandage on Saturday, then wash gently and apply a thin layer Vaseline and a band-aid to site daily for 1 week. 3. Watch for:  BLEEDING: A small amount of drainage may occur. If bleeding occurs, elevate and rest the surgery site. Apply gauze and steady pressure for 15 minutes. If bleeding continues, call this office. INFECTION: Signs of infection include increased redness, pain, warmth, drainage of pus, and fever. If this occurs, call this office. 4. Special Instructions (follow any that are checked):  · [x] You have stitches that DO need to be removed. · [] Avoid bending at the waist and heavy lifting for two days. · [] Sleep with your head elevated for the next two nights. · [x] Rest the surgery site and keep it elevated as much as possible for two days. · [x] You may apply an ice-pack for 10-15 minutes every waking hour for the rest of the day. · [] Eat a soft diet and avoid hot food and hot drinks for the rest of the day. · [] Other instructions: Follow up as directed. Take Tylenol or Ibuprofen for pain as needed. Once the site is healed with no remaining bandages or open areas, protect your surgical site and scar from the sun, as this area will be more sensitive. Use a broad spectrum sunscreen SPF 30 or higher daily, and a chemical free product (one containing zinc oxide or titanium dioxide) is a good choice if the area is sensitive.     You may begin to gently massage the surgical site in 2-3 weeks, rubbing in a circular motion along the scar. This can help reduce swelling and thickness of a scar. A scar cream may be used beginnning 1 month after the surgery. If you have any questions or concerns, please call our office Monday through Friday at [phone].

## 2017-12-28 NOTE — PROGRESS NOTES
Pre-op: Patient presents today for the evaluation of SCC to the L lower medial leg. Procedure explained with full understanding. Vitals:    12/28/17 1019   BP: 130/70   Pulse: 69   Resp: 18   Temp: 97.6 °F (36.4 °C)   TempSrc: Oral   SpO2: 97%   Weight: 54.9 kg (121 lb 0.5 oz)   Height: 5' 5\" (1.651 m)     preoperatively, will continue to monitor. Post-op: Written and verbal post-op wound care instructions given to patient with full understanding of care. Surgical wound bandaged with Vaseline, Telfa, 2x2 gauze, and coverall tape. All questions and concerns addressed. Vitals stable postoperatively.

## 2017-12-28 NOTE — MR AVS SNAPSHOT
Visit Information Date & Time Provider Department Dept. Phone Encounter #  
 12/28/2017 10:00 AM Rory Nolen MD [facility] [address] [phone] [id number] Upcoming Health Maintenance Date Due COLONOSCOPY 7/12/1950 GLAUCOMA SCREENING Q2Y 7/12/1997 MEDICARE YEARLY EXAM 7/12/1997 Pneumococcal 65+ High/Highest Risk (2 of 2 - PPSV23) 11/5/2007 DTaP/Tdap/Td series (1 - Tdap) 9/15/2016 Influenza Age 5 to Adult 8/1/2017 Allergies as of 12/28/2017  Review Complete On: 12/28/2017 By: Michoacano Melara RN Severity Noted Reaction Type Reactions Lemon  12/27/2011    Unknown (comments) Headache and close throat Novocain [Procaine]  09/05/2011    Anaphylaxis Caused her to faint Current Immunizations  Reviewed on 11/15/2013 Name Date Influenza High Dose Vaccine PF 9/14/2016 Influenza Vaccine 11/14/2013 Influenza Vaccine Split 11/3/2011 Td, Adsorbed PF 9/14/2016 ZZZ-RETIRED (DO NOT USE) Pneumococcal Vaccine (Unspecified Type) 11/5/2002 Not reviewed this visit You Were Diagnosed With   
  
 Codes Comments Squamous cell carcinoma of left lower leg    -  Primary ICD-10-CM: M48.060 ICD-9-CM: 173.72 Vitals BP Pulse Temp Resp Height(growth percentile) Weight(growth percentile) 130/70 (BP 1 Location: Left arm, BP Patient Position: Sitting) 69 97.6 °F (36.4 °C) (Oral) 18 5' 5\" (1.651 m) 121 lb 0.5 oz (54.9 kg) SpO2 BMI OB Status Smoking Status 97% 20.14 kg/m2 Hysterectomy Former Smoker BMI and BSA Data Body Mass Index Body Surface Area  
 20.14 kg/m 2 1.59 m 2 Preferred Pharmacy Pharmacy Name Phone [address], [address] [phone] Your Updated Medication List  
  
   
This list is accurate as of: 12/28/17 10:22 AM.  Always use your most recent med list.  
  
  
  
  
 azithromycin 250 mg tablet Commonly known as:  María [place] Take 1 Tab by mouth daily for 5 days. Biotin 2,500 mcg Cap Take  by mouth. CENTRUM SILVER PO Take  by mouth. CLARITIN-D 12 HOUR 5-120 mg per tablet Generic drug:  loratadine-pseudoephedrine Take 1 Tab by mouth two (2) times daily as needed. conjugated estrogens 0.625 mg tablet Commonly known as:  PREMARIN  
TAKE (1) TABLET DAILY. diazePAM 2 mg tablet Commonly known as:  VALIUM Take  by mouth every six (6) hours as needed for Anxiety. fluorouracil 5 % chemo cream  
Commonly known as:  EFUDEX Apply a thin layer to the pink areas on each upper arm twice daily for 4 weeks. latanoprost 0.005 % ophthalmic solution Commonly known as:  XALATAN  
  
 levothyroxine 25 mcg tablet Commonly known as:  SYNTHROID  
TAKE 1 TABLET BY MOUTH ONCE DAILY. potassium chloride 10 mEq tablet Commonly known as:  KLOR-CON Take 1 Tab by mouth daily. promethazine-dextromethorphan 6.25-15 mg/5 mL syrup Commonly known as:  PROMETHAZINE-DM Take 5 mL by mouth every four (4) hours as needed for Cough for up to 7 days. triamcinolone acetonide 0.1 % ointment Commonly known as:  KENALOG Apply  to affected area two (2) times a day. use thin layer VITAMIN D3 2,000 unit Tab Generic drug:  cholecalciferol (vitamin D3) Take 1,000 Units by mouth. Patient Instructions WOUND CARE INSTRUCTIONS 1. Keep the dressing clean and dry and do not remove for 48 hours. 2. Then change the dressing once a day as follows: 
a. Wash hands before and after each dressing change. b. Remove dressing and wash site gently with mild soap and water, rinse, and pat dry. 
c. Apply an ointment (Bacitracin, Polysporin, Neosporin, Petroleum jelly or Aquaphor). d. Apply a non-stick (Telfa) dressing or Band-Aid to cover the wound.  
 
Remove pressure bandage on Saturday, then wash gently and apply a thin layer Vaseline and a band-aid to site daily for 1 week. 3. Watch for: BLEEDING: A small amount of drainage may occur. If bleeding occurs, elevate and rest the surgery site. Apply gauze and steady pressure for 15 minutes. If bleeding continues, call this office. INFECTION: Signs of infection include increased redness, pain, warmth, drainage of pus, and fever. If this occurs, call this office. 4. Special Instructions (follow any that are checked): ·  You have stitches that DO need to be removed. ·  Avoid bending at the waist and heavy lifting for two days. ·  Sleep with your head elevated for the next two nights. ·  Rest the surgery site and keep it elevated as much as possible for two days. ·  You may apply an ice-pack for 10-15 minutes every waking hour for the rest of the day. ·  Eat a soft diet and avoid hot food and hot drinks for the rest of the day. ·  Other instructions: Follow up as directed. Take Tylenol or Ibuprofen for pain as needed. Once the site is healed with no remaining bandages or open areas, protect your surgical site and scar from the sun, as this area will be more sensitive. Use a broad spectrum sunscreen SPF 30 or higher daily, and a chemical free product (one containing zinc oxide or titanium dioxide) is a good choice if the area is sensitive. You may begin to gently massage the surgical site in 2-3 weeks, rubbing in a circular motion along the scar. This can help reduce swelling and thickness of a scar. A scar cream may be used beginnning 1 month after the surgery. If you have any questions or concerns, please call our office Monday through Friday at [phone]. Introducing [facility] & HEALTH SERVICES! Cale Bucio introduces [employer] patient portal. Now you can access parts of your medical record, email your doctor's office, and request medication refills online. 1. In your internet browser, go to [email or web address] 2. Click on the First Time User? Click Here link in the Sign In box. You will see the New Member Sign Up page. 3. Enter your twidox Access Code exactly as it appears below. You will not need to use this code after youve completed the sign-up process. If you do not sign up before the expiration date, you must request a new code. · twidox Access Code: [email or web address] Expires: 1/7/2018 11:56 AM 
 
4. Enter the last four digits of your Social Security Number (xxxx) and Date of Birth (mm/dd/yyyy) as indicated and click Submit. You will be taken to the next sign-up page. 5. Create a twidox ID. This will be your twidox login ID and cannot be changed, so think of one that is secure and easy to remember. 6. Create a twidox password. You can change your password at any time. 7. Enter your Password Reset Question and Answer. This can be used at a later time if you forget your password. 8. Enter your e-mail address. You will receive e-mail notification when new information is available in [address] 9. Click Sign Up. You can now view and download portions of your medical record. 10. Click the Download Summary menu link to download a portable copy of your medical information. If you have questions, please visit the Frequently Asked Questions section of the twidox website. Remember, twidox is NOT to be used for urgent needs. For medical emergencies, dial 911. Now available from your iPhone and Android! Please provide this summary of care documentation to your next provider. Your primary care clinician is listed as Kristyn Maddox. If you have any questions after today's visit, please call [phone].

## 2017-12-28 NOTE — PROGRESS NOTES
This note is written by [facility], as dictated by Aliyah Hughes. Joel Sampson MD.    CC: Squamous cell carcinoma on the left lower medial leg     History of present illness:     Анна Castaneda is a 80 y.o. female referred by Teena Reed DNP. 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 This is a new squamous cell carcinoma present for two weeks described as a tender lesion with no prior treatment. Biopsy confirmed the diagnosis of squamous cell carcinoma, and I reviewed the written pathology. She is feeling well and in her usual state of health today. She has no pain, no current illnesses, no other skin concerns. Her allergies, medications, medical, and social history are reviewed by me today. Exam:     She is an awake, alert, and oriented 80 y.o. female who appears well and in no distress. I examined her left lower medial leg. She has an 11 x 8 mm keratotic papule on her left lower medial leg; there is an adjacent healing skin tear. She confirms location. Assessment/plan:    1. Squamous cell carcinoma, left lower medial leg. I discussed the diagnosis of squamous cell carcinoma and summarized the pathology report. Mohs surgery is indicated by site, size, and histology. The procedure was discussed, verbal and written consent were obtained. I performed the procedure. Two stages were required to reach a tumor free plane. The surgical defect was managed with intermediate repair. There were no complications. She will follow up in one week and as needed as the site heals. Indications, risks, and options were discussed with Анна Castaneda preoperatively. Risks including, but not limited to: pain, bleeding, infection, tumor recurrence, scarring and damage to motor and/or sensory nerves, were discussed. Анна Castaneda chose Mohs surgery. Анна Castaneda was an acceptable surgery candidate.     Анна Castaneda was placed in the appropriate position on the operating table in the Mohs surgery procedure room. The area was prepped and draped in the standard manner. Gentian violet was used to outline the clinical margins of the tumor. Local anesthesia was then obtained. The grossly visible tumor was then removed, an underlying layer was excised and mapped according to the Mohs technique, and the individual specimens examined microscopically. The process was repeated until microscopic examination of the tissue specimens confirmed a tumor-free plane. Hemostasis was obtained with electrosurgery and pressure. The wound was covered between stages with moist saline gauze. The wound management options of second intent healing, layered closure, local flap, and/or full thickness skin graft were discussed. Desmond Rajan understands the aims, risks, alternatives, and possible complications and elects to proceed with an intermediate layered closure. Wound margins were made vertical, edges undermined in the deep subcutaneous plane, standing cones corrected at both poles followed by layered closure. The wound was closed with buried 4-0 polysorb suture in the subcutis to reduce tension on the skin edges, and skin edges were approximated with 4-0 prolene suture in the dermis to reduce tension on the epidermis. The final closure length was 28 mm. The wound was bandaged with Vaseline, Telfa, gauze and Coban. Wound care instructions (written and verbal) and a follow up appointment were given to Desmond Rajan before discharge. Desmond Rajan was discharged in good condition. 2. History of nonmelanoma skin cancer. I discussed the diagnosis and recommend routine examinations with Yolette Downing DNP for surveillance. The documentation recorded by the scribe accurately reflects the service I personally performed and the decisions made by me.      [address]   OFFICE PROCEDURE PROGRESS NOTE     Chart reviewed for the following:     Romario Lainez MD, have reviewed the History, Physical and updated the Allergic reactions for [address] performed immediately prior to start of procedure:     Romario Lainez MD, have performed the following reviews on Cedrick Myers prior to the start of the procedure:     * Patient was identified by name and date of birth   * Agreement on procedure being performed was verified   * Risks and Benefits explained to the patient   * Procedure site verified and marked as necessary   * Patient was positioned for comfort   * Consent was signed and verified     Time: 10:10 AM  Date of procedure: 12/28/2017  Procedure performed by: Cira Smith.  Alex Lainez MD   Provider assisted by: RN  Patient assisted by: self   How tolerated by patient: tolerated the procedure well with no complications   Comments: none

## 2018-01-04 ENCOUNTER — OFFICE VISIT (OUTPATIENT)
Dept: DERMATOLOGY | Facility: AMBULATORY SURGERY CENTER | Age: 83
End: 2018-01-04

## 2018-01-04 VITALS
SYSTOLIC BLOOD PRESSURE: 136 MMHG | HEART RATE: 76 BPM | OXYGEN SATURATION: 98 % | TEMPERATURE: 97.4 F | DIASTOLIC BLOOD PRESSURE: 82 MMHG | RESPIRATION RATE: 14 BRPM

## 2018-01-04 DIAGNOSIS — Z85.828 HISTORY OF MOH'S MICROGRAPHIC SURGERY FOR SKIN CANCER: ICD-10-CM

## 2018-01-04 DIAGNOSIS — Z09 SURGICAL FOLLOW-UP CARE: Primary | ICD-10-CM

## 2018-01-04 DIAGNOSIS — Z98.890 HISTORY OF MOH'S MICROGRAPHIC SURGERY FOR SKIN CANCER: ICD-10-CM

## 2018-01-04 NOTE — PROGRESS NOTES
Wound check/suture removal:    Chief complaint: wound check. HPI: Todd Melissa presents for wound check following mohs surgery performed on left lower leg for SCC. Exam: The surgical site was examined. There is not evidence of infection. There is no erythema. There is not edema. The adjacent skin tear is well healed. A/P:  Wound check. The surgical site is healing well. Additional care was reviewed.   Follow up will be next week for suture removal.

## 2018-01-11 ENCOUNTER — OFFICE VISIT (OUTPATIENT)
Dept: DERMATOLOGY | Facility: AMBULATORY SURGERY CENTER | Age: 83
End: 2018-01-11

## 2018-01-11 DIAGNOSIS — Z48.02 ENCOUNTER FOR REMOVAL OF SUTURES: Primary | ICD-10-CM

## 2018-01-11 NOTE — PROGRESS NOTES
This note was written by Sebastian Crespo, as dictated by Elif Chavez MD.     Chief complaint: Wound check and suture removal     HPI: Panfilo Fernandez presents for wound check and suture removal following Mohs surgery performed on 12/28/2017. I treated a squamous cell carcinoma on her left lower medial leg. The surgical site was managed with intermediate repair, utilizing 4-0 polysorb and 4-0 prolene sutures. She presented on 01/04/2018 for a wound check and the surgical site was healing well. She reports the site has continued to heal well. Exam: The surgical site was examined. There is not evidence of infection. There is mild erythema and edema. There are 4-0 prolene sutures at the surface. Assessment/Plan:      1. Wound check and suture removal, left lower medial leg. The surgical site is healing well. The 4-0 prolene sutures were removed by Shaw Shankar RN. Additional care was reviewed. Follow up will be as needed. The documentation recorded by the scribe accurately reflects the service I personally performed and the decisions made by me.

## 2018-03-23 ENCOUNTER — TELEPHONE (OUTPATIENT)
Dept: CARDIOLOGY CLINIC | Age: 83
End: 2018-03-23

## 2018-03-23 NOTE — TELEPHONE ENCOUNTER
Called Mr. Michelle Howard. Verified patient's identity with two identifiers. Mr. Michelle Howard states he and his wife are in [place]. Mrs. Michelle Howard had a stroke and is now in rehab. He wants to possibly bring her home, but states he wanted to just talk to Dr. Bruce Zimmerman to talk with him as to whether he thinks he is doing the right thing. I told him Dr. Bruce Zimmerman is not in the office, but I could send him a message to call him Monday. He states he will call back to speak with Dr. Bruce Zimmerman and denied further questions or concerns.

## 2018-03-23 NOTE — TELEPHONE ENCOUNTER
Patients  called stating pt has a stroke while they were in [place]. He would like a return call at [phone].  Thank you

## 2018-03-30 ENCOUNTER — HOSPITAL ENCOUNTER (OUTPATIENT)
Age: 83
Discharge: HOME | End: 2018-05-05
Attending: PHYSICAL MEDICINE & REHABILITATION | Admitting: PHYSICAL MEDICINE & REHABILITATION

## 2018-03-30 PROCEDURE — 74011000250 HC RX REV CODE- 250: Performed by: PHYSICAL MEDICINE & REHABILITATION

## 2018-03-30 PROCEDURE — 74011250637 HC RX REV CODE- 250/637: Performed by: PHYSICAL MEDICINE & REHABILITATION

## 2018-03-30 RX ORDER — CIPROFLOXACIN 250 MG/1
500 TABLET, FILM COATED ORAL 2 TIMES DAILY
Status: DISPENSED | OUTPATIENT
Start: 2018-03-30 | End: 2018-04-04

## 2018-03-30 RX ORDER — ENOXAPARIN SODIUM 100 MG/ML
30 INJECTION SUBCUTANEOUS DAILY
Status: DISCONTINUED | OUTPATIENT
Start: 2018-03-31 | End: 2018-05-05 | Stop reason: HOSPADM

## 2018-03-30 RX ORDER — LATANOPROST 50 UG/ML
1 SOLUTION/ DROPS OPHTHALMIC
Status: DISCONTINUED | OUTPATIENT
Start: 2018-03-30 | End: 2018-05-05 | Stop reason: HOSPADM

## 2018-03-30 RX ORDER — ATORVASTATIN CALCIUM 20 MG/1
20 TABLET, FILM COATED ORAL
Status: DISCONTINUED | OUTPATIENT
Start: 2018-03-31 | End: 2018-05-05 | Stop reason: HOSPADM

## 2018-03-30 RX ORDER — ADHESIVE BANDAGE
30 BANDAGE TOPICAL DAILY PRN
Status: DISCONTINUED | OUTPATIENT
Start: 2018-03-30 | End: 2018-05-05 | Stop reason: HOSPADM

## 2018-03-30 RX ORDER — VALSARTAN 40 MG/1
20 TABLET ORAL DAILY
Status: DISCONTINUED | OUTPATIENT
Start: 2018-03-31 | End: 2018-04-23

## 2018-03-30 RX ORDER — LORATADINE 10 MG/1
10 TABLET ORAL DAILY
Status: DISCONTINUED | OUTPATIENT
Start: 2018-03-30 | End: 2018-05-05 | Stop reason: HOSPADM

## 2018-03-30 RX ORDER — LORATADINE 10 MG/1
10 TABLET ORAL DAILY
Status: DISCONTINUED | OUTPATIENT
Start: 2018-03-31 | End: 2018-03-30

## 2018-03-30 RX ORDER — FAMOTIDINE 20 MG/1
20 TABLET, FILM COATED ORAL
Status: DISCONTINUED | OUTPATIENT
Start: 2018-03-31 | End: 2018-05-05 | Stop reason: HOSPADM

## 2018-03-30 RX ORDER — LANOLIN ALCOHOL/MO/W.PET/CERES
1000 CREAM (GRAM) TOPICAL DAILY
Status: DISCONTINUED | OUTPATIENT
Start: 2018-03-31 | End: 2018-05-05 | Stop reason: HOSPADM

## 2018-03-30 RX ORDER — CLOPIDOGREL BISULFATE 75 MG/1
75 TABLET ORAL DAILY
Status: DISCONTINUED | OUTPATIENT
Start: 2018-03-31 | End: 2018-05-05 | Stop reason: HOSPADM

## 2018-03-30 RX ORDER — NAPROXEN SODIUM 220 MG
220 TABLET ORAL
Status: DISCONTINUED | OUTPATIENT
Start: 2018-03-30 | End: 2018-05-05 | Stop reason: HOSPADM

## 2018-03-30 RX ORDER — GUAIFENESIN 100 MG/5ML
81 LIQUID (ML) ORAL DAILY
Status: DISCONTINUED | OUTPATIENT
Start: 2018-03-31 | End: 2018-05-05 | Stop reason: HOSPADM

## 2018-03-30 RX ORDER — LANOLIN ALCOHOL/MO/W.PET/CERES
3 CREAM (GRAM) TOPICAL
Status: DISCONTINUED | OUTPATIENT
Start: 2018-03-30 | End: 2018-05-05 | Stop reason: HOSPADM

## 2018-03-30 RX ORDER — LEVOTHYROXINE SODIUM 50 UG/1
25 TABLET ORAL
Status: DISCONTINUED | OUTPATIENT
Start: 2018-03-31 | End: 2018-05-05 | Stop reason: HOSPADM

## 2018-03-30 RX ORDER — ACETAMINOPHEN 325 MG/1
650 TABLET ORAL
Status: DISCONTINUED | OUTPATIENT
Start: 2018-03-30 | End: 2018-05-05 | Stop reason: HOSPADM

## 2018-03-30 RX ADMIN — LATANOPROST 1 DROP: 50 SOLUTION OPHTHALMIC at 21:39

## 2018-03-30 RX ADMIN — LORATADINE 10 MG: 10 TABLET ORAL at 18:54

## 2018-03-30 RX ADMIN — CIPROFLOXACIN HYDROCHLORIDE 500 MG: 250 TABLET, FILM COATED ORAL at 19:39

## 2018-03-31 LAB
25(OH)D3 SERPL-MCNC: 38.4 NG/ML (ref 30–100)
ANION GAP SERPL CALC-SCNC: 9 MMOL/L (ref 5–15)
BUN SERPL-MCNC: 15 MG/DL (ref 6–20)
BUN/CREAT SERPL: 19 (ref 12–20)
CALCIUM SERPL-MCNC: 8.8 MG/DL (ref 8.5–10.1)
CHLORIDE SERPL-SCNC: 109 MMOL/L (ref 97–108)
CO2 SERPL-SCNC: 22 MMOL/L (ref 21–32)
CREAT SERPL-MCNC: 0.79 MG/DL (ref 0.55–1.02)
ERYTHROCYTE [DISTWIDTH] IN BLOOD BY AUTOMATED COUNT: 12.5 % (ref 11.5–14.5)
GLUCOSE SERPL-MCNC: 85 MG/DL (ref 65–100)
HCT VFR BLD AUTO: 36.6 % (ref 35–47)
HGB BLD-MCNC: 12.6 G/DL (ref 11.5–16)
MAGNESIUM SERPL-MCNC: 1.9 MG/DL (ref 1.6–2.4)
MCH RBC QN AUTO: 32.6 PG (ref 26–34)
MCHC RBC AUTO-ENTMCNC: 34.4 G/DL (ref 30–36.5)
MCV RBC AUTO: 94.8 FL (ref 80–99)
NRBC # BLD: 0 K/UL (ref 0–0.01)
NRBC BLD-RTO: 0 PER 100 WBC
PLATELET # BLD AUTO: 192 K/UL (ref 150–400)
PMV BLD AUTO: 9.8 FL (ref 8.9–12.9)
POTASSIUM SERPL-SCNC: 3.6 MMOL/L (ref 3.5–5.1)
RBC # BLD AUTO: 3.86 M/UL (ref 3.8–5.2)
SODIUM SERPL-SCNC: 140 MMOL/L (ref 136–145)
WBC # BLD AUTO: 6.8 K/UL (ref 3.6–11)

## 2018-03-31 PROCEDURE — 83735 ASSAY OF MAGNESIUM: CPT | Performed by: PHYSICAL MEDICINE & REHABILITATION

## 2018-03-31 PROCEDURE — 85027 COMPLETE CBC AUTOMATED: CPT | Performed by: PHYSICAL MEDICINE & REHABILITATION

## 2018-03-31 PROCEDURE — 74011250636 HC RX REV CODE- 250/636: Performed by: PHYSICAL MEDICINE & REHABILITATION

## 2018-03-31 PROCEDURE — 36415 COLL VENOUS BLD VENIPUNCTURE: CPT | Performed by: PHYSICAL MEDICINE & REHABILITATION

## 2018-03-31 PROCEDURE — 82306 VITAMIN D 25 HYDROXY: CPT | Performed by: PHYSICAL MEDICINE & REHABILITATION

## 2018-03-31 PROCEDURE — 74011250637 HC RX REV CODE- 250/637: Performed by: PHYSICAL MEDICINE & REHABILITATION

## 2018-03-31 PROCEDURE — 80048 BASIC METABOLIC PNL TOTAL CA: CPT | Performed by: PHYSICAL MEDICINE & REHABILITATION

## 2018-03-31 RX ADMIN — CYANOCOBALAMIN TAB 500 MCG 1000 MCG: 500 TAB at 09:02

## 2018-03-31 RX ADMIN — LORATADINE 10 MG: 10 TABLET ORAL at 17:06

## 2018-03-31 RX ADMIN — FAMOTIDINE 20 MG: 20 TABLET, FILM COATED ORAL at 09:02

## 2018-03-31 RX ADMIN — CIPROFLOXACIN HYDROCHLORIDE 500 MG: 250 TABLET, FILM COATED ORAL at 19:49

## 2018-03-31 RX ADMIN — MAGNESIUM HYDROXIDE 30 ML: 400 SUSPENSION ORAL at 17:07

## 2018-03-31 RX ADMIN — ATORVASTATIN CALCIUM 20 MG: 20 TABLET, FILM COATED ORAL at 19:49

## 2018-03-31 RX ADMIN — CIPROFLOXACIN HYDROCHLORIDE 500 MG: 250 TABLET, FILM COATED ORAL at 06:32

## 2018-03-31 RX ADMIN — CLOPIDOGREL BISULFATE 75 MG: 75 TABLET ORAL at 09:02

## 2018-03-31 RX ADMIN — ASPIRIN 81 MG 81 MG: 81 TABLET ORAL at 09:02

## 2018-03-31 RX ADMIN — VALSARTAN 20 MG: 40 TABLET, FILM COATED ORAL at 09:01

## 2018-03-31 RX ADMIN — ENOXAPARIN SODIUM 30 MG: 30 INJECTION, SOLUTION INTRAVENOUS; SUBCUTANEOUS at 13:37

## 2018-03-31 RX ADMIN — LATANOPROST 1 DROP: 50 SOLUTION OPHTHALMIC at 19:49

## 2018-03-31 RX ADMIN — LEVOTHYROXINE SODIUM 25 MCG: 50 TABLET ORAL at 06:32

## 2018-04-01 LAB
APPEARANCE UR: ABNORMAL
BACTERIA URNS QL MICRO: ABNORMAL /HPF
BILIRUB UR QL: NEGATIVE
COLOR UR: ABNORMAL
EPITH CASTS URNS QL MICRO: ABNORMAL /LPF
GLUCOSE UR STRIP.AUTO-MCNC: NEGATIVE MG/DL
HGB UR QL STRIP: ABNORMAL
KETONES UR QL STRIP.AUTO: NEGATIVE MG/DL
LEUKOCYTE ESTERASE UR QL STRIP.AUTO: ABNORMAL
NITRITE UR QL STRIP.AUTO: POSITIVE
PH UR STRIP: 6.5 [PH] (ref 5–8)
PROT UR STRIP-MCNC: NEGATIVE MG/DL
RBC #/AREA URNS HPF: ABNORMAL /HPF (ref 0–5)
SP GR UR REFRACTOMETRY: 1.02 (ref 1–1.03)
UROBILINOGEN UR QL STRIP.AUTO: 0.2 EU/DL (ref 0.2–1)
WBC URNS QL MICRO: ABNORMAL /HPF (ref 0–4)

## 2018-04-01 PROCEDURE — 81001 URINALYSIS AUTO W/SCOPE: CPT | Performed by: PHYSICAL MEDICINE & REHABILITATION

## 2018-04-01 PROCEDURE — 74011250637 HC RX REV CODE- 250/637: Performed by: PHYSICAL MEDICINE & REHABILITATION

## 2018-04-01 PROCEDURE — 87077 CULTURE AEROBIC IDENTIFY: CPT | Performed by: PHYSICAL MEDICINE & REHABILITATION

## 2018-04-01 PROCEDURE — 87086 URINE CULTURE/COLONY COUNT: CPT | Performed by: PHYSICAL MEDICINE & REHABILITATION

## 2018-04-01 PROCEDURE — 74011250636 HC RX REV CODE- 250/636: Performed by: PHYSICAL MEDICINE & REHABILITATION

## 2018-04-01 PROCEDURE — 87186 SC STD MICRODIL/AGAR DIL: CPT | Performed by: PHYSICAL MEDICINE & REHABILITATION

## 2018-04-01 RX ADMIN — LATANOPROST 1 DROP: 50 SOLUTION OPHTHALMIC at 20:14

## 2018-04-01 RX ADMIN — ENOXAPARIN SODIUM 30 MG: 30 INJECTION, SOLUTION INTRAVENOUS; SUBCUTANEOUS at 13:35

## 2018-04-01 RX ADMIN — VALSARTAN 20 MG: 40 TABLET, FILM COATED ORAL at 08:56

## 2018-04-01 RX ADMIN — CIPROFLOXACIN HYDROCHLORIDE 500 MG: 250 TABLET, FILM COATED ORAL at 05:58

## 2018-04-01 RX ADMIN — CYANOCOBALAMIN TAB 500 MCG 1000 MCG: 500 TAB at 08:55

## 2018-04-01 RX ADMIN — ATORVASTATIN CALCIUM 20 MG: 20 TABLET, FILM COATED ORAL at 20:14

## 2018-04-01 RX ADMIN — ASPIRIN 81 MG 81 MG: 81 TABLET ORAL at 08:57

## 2018-04-01 RX ADMIN — CIPROFLOXACIN HYDROCHLORIDE 500 MG: 250 TABLET, FILM COATED ORAL at 20:07

## 2018-04-01 RX ADMIN — LORATADINE 10 MG: 10 TABLET ORAL at 17:37

## 2018-04-01 RX ADMIN — FAMOTIDINE 20 MG: 20 TABLET, FILM COATED ORAL at 08:57

## 2018-04-01 RX ADMIN — LEVOTHYROXINE SODIUM 25 MCG: 50 TABLET ORAL at 05:58

## 2018-04-01 RX ADMIN — CLOPIDOGREL BISULFATE 75 MG: 75 TABLET ORAL at 08:57

## 2018-04-02 PROCEDURE — 74011250636 HC RX REV CODE- 250/636: Performed by: PHYSICAL MEDICINE & REHABILITATION

## 2018-04-02 PROCEDURE — 74011250637 HC RX REV CODE- 250/637: Performed by: PHYSICAL MEDICINE & REHABILITATION

## 2018-04-02 RX ADMIN — ENOXAPARIN SODIUM 30 MG: 30 INJECTION, SOLUTION INTRAVENOUS; SUBCUTANEOUS at 08:22

## 2018-04-02 RX ADMIN — CIPROFLOXACIN HYDROCHLORIDE 500 MG: 250 TABLET, FILM COATED ORAL at 20:01

## 2018-04-02 RX ADMIN — LORATADINE 10 MG: 10 TABLET ORAL at 17:28

## 2018-04-02 RX ADMIN — MAGNESIUM HYDROXIDE 30 ML: 400 SUSPENSION ORAL at 17:29

## 2018-04-02 RX ADMIN — ATORVASTATIN CALCIUM 20 MG: 20 TABLET, FILM COATED ORAL at 20:01

## 2018-04-02 RX ADMIN — CYANOCOBALAMIN TAB 500 MCG 1000 MCG: 500 TAB at 08:22

## 2018-04-02 RX ADMIN — VALSARTAN 20 MG: 40 TABLET, FILM COATED ORAL at 08:23

## 2018-04-02 RX ADMIN — CLOPIDOGREL BISULFATE 75 MG: 75 TABLET ORAL at 08:22

## 2018-04-02 RX ADMIN — CIPROFLOXACIN HYDROCHLORIDE 500 MG: 250 TABLET, FILM COATED ORAL at 05:16

## 2018-04-02 RX ADMIN — LEVOTHYROXINE SODIUM 25 MCG: 50 TABLET ORAL at 05:16

## 2018-04-02 RX ADMIN — ASPIRIN 81 MG 81 MG: 81 TABLET ORAL at 08:22

## 2018-04-02 RX ADMIN — FAMOTIDINE 20 MG: 20 TABLET, FILM COATED ORAL at 08:22

## 2018-04-02 RX ADMIN — LATANOPROST 1 DROP: 50 SOLUTION OPHTHALMIC at 20:02

## 2018-04-03 PROCEDURE — 74011250636 HC RX REV CODE- 250/636: Performed by: PHYSICAL MEDICINE & REHABILITATION

## 2018-04-03 PROCEDURE — 74011250637 HC RX REV CODE- 250/637: Performed by: PHYSICAL MEDICINE & REHABILITATION

## 2018-04-03 RX ADMIN — VALSARTAN 20 MG: 40 TABLET, FILM COATED ORAL at 08:06

## 2018-04-03 RX ADMIN — FAMOTIDINE 20 MG: 20 TABLET, FILM COATED ORAL at 08:06

## 2018-04-03 RX ADMIN — CYANOCOBALAMIN TAB 500 MCG 1000 MCG: 500 TAB at 08:06

## 2018-04-03 RX ADMIN — CIPROFLOXACIN HYDROCHLORIDE 500 MG: 250 TABLET, FILM COATED ORAL at 05:30

## 2018-04-03 RX ADMIN — LORATADINE 10 MG: 10 TABLET ORAL at 17:23

## 2018-04-03 RX ADMIN — CLOPIDOGREL BISULFATE 75 MG: 75 TABLET ORAL at 08:06

## 2018-04-03 RX ADMIN — NAPROXEN SODIUM 220 MG: 220 TABLET, FILM COATED ORAL at 19:45

## 2018-04-03 RX ADMIN — ASPIRIN 81 MG 81 MG: 81 TABLET ORAL at 08:05

## 2018-04-03 RX ADMIN — LATANOPROST 1 DROP: 50 SOLUTION OPHTHALMIC at 19:45

## 2018-04-03 RX ADMIN — CIPROFLOXACIN HYDROCHLORIDE 500 MG: 250 TABLET, FILM COATED ORAL at 19:45

## 2018-04-03 RX ADMIN — ENOXAPARIN SODIUM 30 MG: 30 INJECTION, SOLUTION INTRAVENOUS; SUBCUTANEOUS at 08:05

## 2018-04-03 RX ADMIN — ACETAMINOPHEN 650 MG: 325 TABLET ORAL at 10:36

## 2018-04-03 RX ADMIN — LEVOTHYROXINE SODIUM 25 MCG: 50 TABLET ORAL at 05:30

## 2018-04-03 RX ADMIN — ATORVASTATIN CALCIUM 20 MG: 20 TABLET, FILM COATED ORAL at 19:45

## 2018-04-04 LAB
BACTERIA SPEC CULT: ABNORMAL
CC UR VC: ABNORMAL
SERVICE CMNT-IMP: ABNORMAL

## 2018-04-04 PROCEDURE — 74011250637 HC RX REV CODE- 250/637: Performed by: PHYSICAL MEDICINE & REHABILITATION

## 2018-04-04 PROCEDURE — 74011250636 HC RX REV CODE- 250/636: Performed by: PHYSICAL MEDICINE & REHABILITATION

## 2018-04-04 RX ADMIN — LORATADINE 10 MG: 10 TABLET ORAL at 17:39

## 2018-04-04 RX ADMIN — LEVOTHYROXINE SODIUM 25 MCG: 50 TABLET ORAL at 06:56

## 2018-04-04 RX ADMIN — ASPIRIN 81 MG 81 MG: 81 TABLET ORAL at 10:16

## 2018-04-04 RX ADMIN — ENOXAPARIN SODIUM 30 MG: 30 INJECTION, SOLUTION INTRAVENOUS; SUBCUTANEOUS at 10:14

## 2018-04-04 RX ADMIN — LATANOPROST 1 DROP: 50 SOLUTION OPHTHALMIC at 21:04

## 2018-04-04 RX ADMIN — FAMOTIDINE 20 MG: 20 TABLET, FILM COATED ORAL at 10:16

## 2018-04-04 RX ADMIN — ATORVASTATIN CALCIUM 20 MG: 20 TABLET, FILM COATED ORAL at 21:04

## 2018-04-04 RX ADMIN — CYANOCOBALAMIN TAB 500 MCG 1000 MCG: 500 TAB at 10:15

## 2018-04-04 RX ADMIN — CLOPIDOGREL BISULFATE 75 MG: 75 TABLET ORAL at 10:15

## 2018-04-04 RX ADMIN — VALSARTAN 20 MG: 40 TABLET, FILM COATED ORAL at 10:15

## 2018-04-05 PROCEDURE — 74011250637 HC RX REV CODE- 250/637: Performed by: PHYSICAL MEDICINE & REHABILITATION

## 2018-04-05 PROCEDURE — 74011250636 HC RX REV CODE- 250/636: Performed by: PHYSICAL MEDICINE & REHABILITATION

## 2018-04-05 RX ORDER — SULFAMETHOXAZOLE AND TRIMETHOPRIM 800; 160 MG/1; MG/1
1 TABLET ORAL EVERY 12 HOURS
Status: DISPENSED | OUTPATIENT
Start: 2018-04-05 | End: 2018-04-12

## 2018-04-05 RX ADMIN — LEVOTHYROXINE SODIUM 25 MCG: 50 TABLET ORAL at 06:19

## 2018-04-05 RX ADMIN — LATANOPROST 1 DROP: 50 SOLUTION OPHTHALMIC at 20:00

## 2018-04-05 RX ADMIN — CLOPIDOGREL BISULFATE 75 MG: 75 TABLET ORAL at 08:39

## 2018-04-05 RX ADMIN — CYANOCOBALAMIN TAB 500 MCG 1000 MCG: 500 TAB at 08:39

## 2018-04-05 RX ADMIN — SULFAMETHOXAZOLE AND TRIMETHOPRIM 1 TABLET: 800; 160 TABLET ORAL at 20:55

## 2018-04-05 RX ADMIN — ENOXAPARIN SODIUM 30 MG: 30 INJECTION, SOLUTION INTRAVENOUS; SUBCUTANEOUS at 08:39

## 2018-04-05 RX ADMIN — ATORVASTATIN CALCIUM 20 MG: 20 TABLET, FILM COATED ORAL at 20:55

## 2018-04-05 RX ADMIN — VALSARTAN 20 MG: 40 TABLET, FILM COATED ORAL at 08:39

## 2018-04-05 RX ADMIN — ACETAMINOPHEN 650 MG: 325 TABLET ORAL at 18:47

## 2018-04-05 RX ADMIN — LORATADINE 10 MG: 10 TABLET ORAL at 18:47

## 2018-04-05 RX ADMIN — FAMOTIDINE 20 MG: 20 TABLET, FILM COATED ORAL at 08:39

## 2018-04-05 RX ADMIN — ASPIRIN 81 MG 81 MG: 81 TABLET ORAL at 08:39

## 2018-04-06 PROCEDURE — 74011250637 HC RX REV CODE- 250/637: Performed by: PHYSICAL MEDICINE & REHABILITATION

## 2018-04-06 PROCEDURE — 74011250636 HC RX REV CODE- 250/636: Performed by: PHYSICAL MEDICINE & REHABILITATION

## 2018-04-06 RX ORDER — LIDOCAINE 50 MG/G
1 PATCH TOPICAL EVERY 24 HOURS
Status: DISCONTINUED | OUTPATIENT
Start: 2018-04-07 | End: 2018-05-05 | Stop reason: HOSPADM

## 2018-04-06 RX ORDER — LIDOCAINE 50 MG/G
1 PATCH TOPICAL ONCE
Status: COMPLETED | OUTPATIENT
Start: 2018-04-06 | End: 2018-04-07

## 2018-04-06 RX ADMIN — SULFAMETHOXAZOLE AND TRIMETHOPRIM 1 TABLET: 800; 160 TABLET ORAL at 20:15

## 2018-04-06 RX ADMIN — VALSARTAN 20 MG: 40 TABLET, FILM COATED ORAL at 08:39

## 2018-04-06 RX ADMIN — LATANOPROST 1 DROP: 50 SOLUTION OPHTHALMIC at 20:00

## 2018-04-06 RX ADMIN — CLOPIDOGREL BISULFATE 75 MG: 75 TABLET ORAL at 08:39

## 2018-04-06 RX ADMIN — SULFAMETHOXAZOLE AND TRIMETHOPRIM 1 TABLET: 800; 160 TABLET ORAL at 08:39

## 2018-04-06 RX ADMIN — FAMOTIDINE 20 MG: 20 TABLET, FILM COATED ORAL at 08:39

## 2018-04-06 RX ADMIN — ASPIRIN 81 MG 81 MG: 81 TABLET ORAL at 08:39

## 2018-04-06 RX ADMIN — LORATADINE 10 MG: 10 TABLET ORAL at 17:41

## 2018-04-06 RX ADMIN — CYANOCOBALAMIN TAB 500 MCG 1000 MCG: 500 TAB at 08:39

## 2018-04-06 RX ADMIN — LEVOTHYROXINE SODIUM 25 MCG: 50 TABLET ORAL at 05:53

## 2018-04-06 RX ADMIN — ATORVASTATIN CALCIUM 20 MG: 20 TABLET, FILM COATED ORAL at 20:15

## 2018-04-06 RX ADMIN — ENOXAPARIN SODIUM 30 MG: 30 INJECTION, SOLUTION INTRAVENOUS; SUBCUTANEOUS at 08:38

## 2018-04-07 PROCEDURE — 74011250636 HC RX REV CODE- 250/636: Performed by: PHYSICAL MEDICINE & REHABILITATION

## 2018-04-07 PROCEDURE — 74011250637 HC RX REV CODE- 250/637: Performed by: PHYSICAL MEDICINE & REHABILITATION

## 2018-04-07 RX ADMIN — SULFAMETHOXAZOLE AND TRIMETHOPRIM 1 TABLET: 800; 160 TABLET ORAL at 21:17

## 2018-04-07 RX ADMIN — LEVOTHYROXINE SODIUM 25 MCG: 50 TABLET ORAL at 06:13

## 2018-04-07 RX ADMIN — VALSARTAN 20 MG: 40 TABLET, FILM COATED ORAL at 08:52

## 2018-04-07 RX ADMIN — SULFAMETHOXAZOLE AND TRIMETHOPRIM 1 TABLET: 800; 160 TABLET ORAL at 08:53

## 2018-04-07 RX ADMIN — CLOPIDOGREL BISULFATE 75 MG: 75 TABLET ORAL at 08:52

## 2018-04-07 RX ADMIN — ATORVASTATIN CALCIUM 20 MG: 20 TABLET, FILM COATED ORAL at 21:17

## 2018-04-07 RX ADMIN — CYANOCOBALAMIN TAB 500 MCG 1000 MCG: 500 TAB at 08:52

## 2018-04-07 RX ADMIN — ENOXAPARIN SODIUM 30 MG: 30 INJECTION, SOLUTION INTRAVENOUS; SUBCUTANEOUS at 08:52

## 2018-04-07 RX ADMIN — LATANOPROST 1 DROP: 50 SOLUTION OPHTHALMIC at 21:26

## 2018-04-07 RX ADMIN — ASPIRIN 81 MG 81 MG: 81 TABLET ORAL at 08:53

## 2018-04-07 RX ADMIN — LORATADINE 10 MG: 10 TABLET ORAL at 18:19

## 2018-04-08 PROCEDURE — 74011250636 HC RX REV CODE- 250/636: Performed by: PHYSICAL MEDICINE & REHABILITATION

## 2018-04-08 PROCEDURE — 74011250637 HC RX REV CODE- 250/637: Performed by: PHYSICAL MEDICINE & REHABILITATION

## 2018-04-08 RX ADMIN — LORATADINE 10 MG: 10 TABLET ORAL at 18:55

## 2018-04-08 RX ADMIN — ATORVASTATIN CALCIUM 20 MG: 20 TABLET, FILM COATED ORAL at 21:41

## 2018-04-08 RX ADMIN — ENOXAPARIN SODIUM 30 MG: 30 INJECTION, SOLUTION INTRAVENOUS; SUBCUTANEOUS at 09:13

## 2018-04-08 RX ADMIN — SULFAMETHOXAZOLE AND TRIMETHOPRIM 1 TABLET: 800; 160 TABLET ORAL at 09:13

## 2018-04-08 RX ADMIN — ASPIRIN 81 MG 81 MG: 81 TABLET ORAL at 09:13

## 2018-04-08 RX ADMIN — SULFAMETHOXAZOLE AND TRIMETHOPRIM 1 TABLET: 800; 160 TABLET ORAL at 21:41

## 2018-04-08 RX ADMIN — LATANOPROST 1 DROP: 50 SOLUTION OPHTHALMIC at 21:41

## 2018-04-08 RX ADMIN — LEVOTHYROXINE SODIUM 25 MCG: 50 TABLET ORAL at 06:55

## 2018-04-08 RX ADMIN — CLOPIDOGREL BISULFATE 75 MG: 75 TABLET ORAL at 09:13

## 2018-04-08 RX ADMIN — CYANOCOBALAMIN TAB 500 MCG 1000 MCG: 500 TAB at 09:13

## 2018-04-08 RX ADMIN — FAMOTIDINE 20 MG: 20 TABLET, FILM COATED ORAL at 09:13

## 2018-04-08 RX ADMIN — VALSARTAN 12.5 MG: 40 TABLET, FILM COATED ORAL at 09:12

## 2018-04-09 PROCEDURE — 74011250636 HC RX REV CODE- 250/636: Performed by: PHYSICAL MEDICINE & REHABILITATION

## 2018-04-09 PROCEDURE — 74011250637 HC RX REV CODE- 250/637: Performed by: PHYSICAL MEDICINE & REHABILITATION

## 2018-04-09 RX ADMIN — ATORVASTATIN CALCIUM 20 MG: 20 TABLET, FILM COATED ORAL at 21:21

## 2018-04-09 RX ADMIN — LORATADINE 10 MG: 10 TABLET ORAL at 18:00

## 2018-04-09 RX ADMIN — FAMOTIDINE 20 MG: 20 TABLET, FILM COATED ORAL at 08:49

## 2018-04-09 RX ADMIN — ASPIRIN 81 MG 81 MG: 81 TABLET ORAL at 08:50

## 2018-04-09 RX ADMIN — LATANOPROST 1 DROP: 50 SOLUTION OPHTHALMIC at 21:28

## 2018-04-09 RX ADMIN — SULFAMETHOXAZOLE AND TRIMETHOPRIM 1 TABLET: 800; 160 TABLET ORAL at 21:21

## 2018-04-09 RX ADMIN — LEVOTHYROXINE SODIUM 25 MCG: 50 TABLET ORAL at 07:11

## 2018-04-09 RX ADMIN — ENOXAPARIN SODIUM 30 MG: 30 INJECTION, SOLUTION INTRAVENOUS; SUBCUTANEOUS at 08:50

## 2018-04-09 RX ADMIN — ACETAMINOPHEN 650 MG: 325 TABLET ORAL at 10:25

## 2018-04-09 RX ADMIN — CLOPIDOGREL BISULFATE 75 MG: 75 TABLET ORAL at 08:49

## 2018-04-09 RX ADMIN — SULFAMETHOXAZOLE AND TRIMETHOPRIM 1 TABLET: 800; 160 TABLET ORAL at 08:50

## 2018-04-09 RX ADMIN — CYANOCOBALAMIN TAB 500 MCG 1000 MCG: 500 TAB at 08:50

## 2018-04-09 RX ADMIN — VALSARTAN 20 MG: 40 TABLET, FILM COATED ORAL at 08:50

## 2018-04-10 PROCEDURE — 74011250636 HC RX REV CODE- 250/636: Performed by: PHYSICAL MEDICINE & REHABILITATION

## 2018-04-10 PROCEDURE — 74011250637 HC RX REV CODE- 250/637: Performed by: PHYSICAL MEDICINE & REHABILITATION

## 2018-04-10 RX ADMIN — CLOPIDOGREL BISULFATE 75 MG: 75 TABLET ORAL at 08:10

## 2018-04-10 RX ADMIN — FAMOTIDINE 20 MG: 20 TABLET, FILM COATED ORAL at 08:09

## 2018-04-10 RX ADMIN — LATANOPROST 1 DROP: 50 SOLUTION OPHTHALMIC at 21:45

## 2018-04-10 RX ADMIN — ENOXAPARIN SODIUM 30 MG: 30 INJECTION, SOLUTION INTRAVENOUS; SUBCUTANEOUS at 08:19

## 2018-04-10 RX ADMIN — LORATADINE 10 MG: 10 TABLET ORAL at 17:29

## 2018-04-10 RX ADMIN — CYANOCOBALAMIN TAB 500 MCG 1000 MCG: 500 TAB at 08:09

## 2018-04-10 RX ADMIN — LEVOTHYROXINE SODIUM 25 MCG: 50 TABLET ORAL at 05:57

## 2018-04-10 RX ADMIN — SULFAMETHOXAZOLE AND TRIMETHOPRIM 1 TABLET: 800; 160 TABLET ORAL at 21:44

## 2018-04-10 RX ADMIN — ASPIRIN 81 MG 81 MG: 81 TABLET ORAL at 08:09

## 2018-04-10 RX ADMIN — VALSARTAN 20 MG: 40 TABLET, FILM COATED ORAL at 08:10

## 2018-04-10 RX ADMIN — SULFAMETHOXAZOLE AND TRIMETHOPRIM 1 TABLET: 800; 160 TABLET ORAL at 08:09

## 2018-04-10 RX ADMIN — ATORVASTATIN CALCIUM 20 MG: 20 TABLET, FILM COATED ORAL at 21:44

## 2018-04-11 PROCEDURE — 74011250637 HC RX REV CODE- 250/637: Performed by: PHYSICAL MEDICINE & REHABILITATION

## 2018-04-11 PROCEDURE — 74011250636 HC RX REV CODE- 250/636: Performed by: PHYSICAL MEDICINE & REHABILITATION

## 2018-04-11 RX ADMIN — VALSARTAN 20 MG: 40 TABLET, FILM COATED ORAL at 08:27

## 2018-04-11 RX ADMIN — ASPIRIN 81 MG 81 MG: 81 TABLET ORAL at 08:27

## 2018-04-11 RX ADMIN — SULFAMETHOXAZOLE AND TRIMETHOPRIM 1 TABLET: 800; 160 TABLET ORAL at 21:03

## 2018-04-11 RX ADMIN — ENOXAPARIN SODIUM 30 MG: 30 INJECTION, SOLUTION INTRAVENOUS; SUBCUTANEOUS at 08:27

## 2018-04-11 RX ADMIN — LATANOPROST 1 DROP: 50 SOLUTION OPHTHALMIC at 21:03

## 2018-04-11 RX ADMIN — FAMOTIDINE 20 MG: 20 TABLET, FILM COATED ORAL at 08:26

## 2018-04-11 RX ADMIN — SULFAMETHOXAZOLE AND TRIMETHOPRIM 1 TABLET: 800; 160 TABLET ORAL at 08:26

## 2018-04-11 RX ADMIN — CYANOCOBALAMIN TAB 500 MCG 1000 MCG: 500 TAB at 08:26

## 2018-04-11 RX ADMIN — CLOPIDOGREL BISULFATE 75 MG: 75 TABLET ORAL at 08:27

## 2018-04-11 RX ADMIN — LEVOTHYROXINE SODIUM 25 MCG: 50 TABLET ORAL at 06:12

## 2018-04-11 RX ADMIN — ATORVASTATIN CALCIUM 20 MG: 20 TABLET, FILM COATED ORAL at 21:03

## 2018-04-11 RX ADMIN — LORATADINE 10 MG: 10 TABLET ORAL at 17:02

## 2018-04-12 PROCEDURE — 74011250636 HC RX REV CODE- 250/636: Performed by: PHYSICAL MEDICINE & REHABILITATION

## 2018-04-12 PROCEDURE — 74011250637 HC RX REV CODE- 250/637: Performed by: PHYSICAL MEDICINE & REHABILITATION

## 2018-04-12 RX ADMIN — ASPIRIN 81 MG 81 MG: 81 TABLET ORAL at 08:50

## 2018-04-12 RX ADMIN — LEVOTHYROXINE SODIUM 25 MCG: 50 TABLET ORAL at 06:24

## 2018-04-12 RX ADMIN — LORATADINE 10 MG: 10 TABLET ORAL at 18:23

## 2018-04-12 RX ADMIN — VALSARTAN 20 MG: 40 TABLET, FILM COATED ORAL at 08:50

## 2018-04-12 RX ADMIN — FAMOTIDINE 20 MG: 20 TABLET, FILM COATED ORAL at 08:50

## 2018-04-12 RX ADMIN — CYANOCOBALAMIN TAB 500 MCG 1000 MCG: 500 TAB at 08:49

## 2018-04-12 RX ADMIN — ENOXAPARIN SODIUM 30 MG: 30 INJECTION, SOLUTION INTRAVENOUS; SUBCUTANEOUS at 08:49

## 2018-04-12 RX ADMIN — CLOPIDOGREL BISULFATE 75 MG: 75 TABLET ORAL at 08:49

## 2018-04-12 RX ADMIN — ATORVASTATIN CALCIUM 20 MG: 20 TABLET, FILM COATED ORAL at 20:56

## 2018-04-12 RX ADMIN — LATANOPROST 1 DROP: 50 SOLUTION OPHTHALMIC at 20:56

## 2018-04-13 PROCEDURE — 74011250636 HC RX REV CODE- 250/636: Performed by: PHYSICAL MEDICINE & REHABILITATION

## 2018-04-13 PROCEDURE — 74011250637 HC RX REV CODE- 250/637: Performed by: PHYSICAL MEDICINE & REHABILITATION

## 2018-04-13 RX ADMIN — CYANOCOBALAMIN TAB 500 MCG 1000 MCG: 500 TAB at 08:33

## 2018-04-13 RX ADMIN — LATANOPROST 1 DROP: 50 SOLUTION OPHTHALMIC at 20:06

## 2018-04-13 RX ADMIN — FAMOTIDINE 20 MG: 20 TABLET, FILM COATED ORAL at 08:33

## 2018-04-13 RX ADMIN — LORATADINE 10 MG: 10 TABLET ORAL at 17:20

## 2018-04-13 RX ADMIN — LEVOTHYROXINE SODIUM 25 MCG: 50 TABLET ORAL at 06:40

## 2018-04-13 RX ADMIN — ATORVASTATIN CALCIUM 20 MG: 20 TABLET, FILM COATED ORAL at 20:06

## 2018-04-13 RX ADMIN — CLOPIDOGREL BISULFATE 75 MG: 75 TABLET ORAL at 08:33

## 2018-04-13 RX ADMIN — ACETAMINOPHEN 650 MG: 325 TABLET ORAL at 10:55

## 2018-04-13 RX ADMIN — VALSARTAN 20 MG: 40 TABLET, FILM COATED ORAL at 08:33

## 2018-04-13 RX ADMIN — ENOXAPARIN SODIUM 30 MG: 30 INJECTION, SOLUTION INTRAVENOUS; SUBCUTANEOUS at 08:32

## 2018-04-13 RX ADMIN — ASPIRIN 81 MG 81 MG: 81 TABLET ORAL at 08:33

## 2018-04-14 PROCEDURE — 74011250636 HC RX REV CODE- 250/636: Performed by: PHYSICAL MEDICINE & REHABILITATION

## 2018-04-14 PROCEDURE — 74011250637 HC RX REV CODE- 250/637: Performed by: PHYSICAL MEDICINE & REHABILITATION

## 2018-04-14 RX ADMIN — VALSARTAN 20 MG: 40 TABLET, FILM COATED ORAL at 08:51

## 2018-04-14 RX ADMIN — ASPIRIN 81 MG 81 MG: 81 TABLET ORAL at 08:51

## 2018-04-14 RX ADMIN — LORATADINE 10 MG: 10 TABLET ORAL at 17:16

## 2018-04-14 RX ADMIN — CLOPIDOGREL BISULFATE 75 MG: 75 TABLET ORAL at 08:51

## 2018-04-14 RX ADMIN — ENOXAPARIN SODIUM 30 MG: 30 INJECTION, SOLUTION INTRAVENOUS; SUBCUTANEOUS at 08:51

## 2018-04-14 RX ADMIN — LATANOPROST 1 DROP: 50 SOLUTION OPHTHALMIC at 21:20

## 2018-04-14 RX ADMIN — LEVOTHYROXINE SODIUM 25 MCG: 50 TABLET ORAL at 06:02

## 2018-04-14 RX ADMIN — ACETAMINOPHEN 650 MG: 325 TABLET ORAL at 09:44

## 2018-04-14 RX ADMIN — FAMOTIDINE 20 MG: 20 TABLET, FILM COATED ORAL at 08:51

## 2018-04-14 RX ADMIN — CYANOCOBALAMIN TAB 500 MCG 1000 MCG: 500 TAB at 08:51

## 2018-04-14 RX ADMIN — ATORVASTATIN CALCIUM 20 MG: 20 TABLET, FILM COATED ORAL at 21:20

## 2018-04-15 LAB
APPEARANCE UR: ABNORMAL
BACTERIA URNS QL MICRO: ABNORMAL /HPF
BILIRUB UR QL: NEGATIVE
COLOR UR: ABNORMAL
EPITH CASTS URNS QL MICRO: ABNORMAL /LPF
GLUCOSE UR STRIP.AUTO-MCNC: NEGATIVE MG/DL
HGB UR QL STRIP: ABNORMAL
KETONES UR QL STRIP.AUTO: NEGATIVE MG/DL
LEUKOCYTE ESTERASE UR QL STRIP.AUTO: ABNORMAL
NITRITE UR QL STRIP.AUTO: NEGATIVE
PH UR STRIP: 5.5 [PH] (ref 5–8)
PROT UR STRIP-MCNC: NEGATIVE MG/DL
RBC #/AREA URNS HPF: ABNORMAL /HPF (ref 0–5)
SP GR UR REFRACTOMETRY: 1.01 (ref 1–1.03)
UROBILINOGEN UR QL STRIP.AUTO: 0.2 EU/DL (ref 0.2–1)
WBC URNS QL MICRO: ABNORMAL /HPF (ref 0–4)
YEAST URNS QL MICRO: PRESENT

## 2018-04-15 PROCEDURE — 74011250636 HC RX REV CODE- 250/636: Performed by: PHYSICAL MEDICINE & REHABILITATION

## 2018-04-15 PROCEDURE — 74011250637 HC RX REV CODE- 250/637: Performed by: PHYSICAL MEDICINE & REHABILITATION

## 2018-04-15 PROCEDURE — 87086 URINE CULTURE/COLONY COUNT: CPT | Performed by: PHYSICAL MEDICINE & REHABILITATION

## 2018-04-15 PROCEDURE — 81001 URINALYSIS AUTO W/SCOPE: CPT | Performed by: PHYSICAL MEDICINE & REHABILITATION

## 2018-04-15 RX ADMIN — LEVOTHYROXINE SODIUM 25 MCG: 50 TABLET ORAL at 06:54

## 2018-04-15 RX ADMIN — FAMOTIDINE 20 MG: 20 TABLET, FILM COATED ORAL at 09:24

## 2018-04-15 RX ADMIN — LATANOPROST 1 DROP: 50 SOLUTION OPHTHALMIC at 20:16

## 2018-04-15 RX ADMIN — ENOXAPARIN SODIUM 30 MG: 30 INJECTION, SOLUTION INTRAVENOUS; SUBCUTANEOUS at 09:24

## 2018-04-15 RX ADMIN — VALSARTAN 20 MG: 40 TABLET, FILM COATED ORAL at 09:24

## 2018-04-15 RX ADMIN — ATORVASTATIN CALCIUM 20 MG: 20 TABLET, FILM COATED ORAL at 20:16

## 2018-04-15 RX ADMIN — LORATADINE 10 MG: 10 TABLET ORAL at 17:11

## 2018-04-15 RX ADMIN — CLOPIDOGREL BISULFATE 75 MG: 75 TABLET ORAL at 09:24

## 2018-04-15 RX ADMIN — CYANOCOBALAMIN TAB 500 MCG 1000 MCG: 500 TAB at 09:24

## 2018-04-15 RX ADMIN — ASPIRIN 81 MG 81 MG: 81 TABLET ORAL at 09:24

## 2018-04-16 PROCEDURE — 74011250636 HC RX REV CODE- 250/636: Performed by: PHYSICAL MEDICINE & REHABILITATION

## 2018-04-16 PROCEDURE — 74011250637 HC RX REV CODE- 250/637: Performed by: PHYSICAL MEDICINE & REHABILITATION

## 2018-04-16 RX ORDER — FLUCONAZOLE 200 MG/1
200 TABLET ORAL DAILY
Status: DISCONTINUED | OUTPATIENT
Start: 2018-04-16 | End: 2018-04-24

## 2018-04-16 RX ORDER — LIDOCAINE 50 MG/G
1 PATCH TOPICAL
Status: COMPLETED | OUTPATIENT
Start: 2018-04-16 | End: 2018-04-16

## 2018-04-16 RX ORDER — LIDOCAINE 50 MG/G
1 PATCH TOPICAL EVERY 24 HOURS
Status: DISCONTINUED | OUTPATIENT
Start: 2018-04-17 | End: 2018-05-05 | Stop reason: HOSPADM

## 2018-04-16 RX ADMIN — FAMOTIDINE 20 MG: 20 TABLET, FILM COATED ORAL at 09:02

## 2018-04-16 RX ADMIN — FLUCONAZOLE 200 MG: 200 TABLET ORAL at 09:02

## 2018-04-16 RX ADMIN — ASPIRIN 81 MG 81 MG: 81 TABLET ORAL at 09:02

## 2018-04-16 RX ADMIN — LEVOTHYROXINE SODIUM 25 MCG: 50 TABLET ORAL at 06:47

## 2018-04-16 RX ADMIN — ATORVASTATIN CALCIUM 20 MG: 20 TABLET, FILM COATED ORAL at 20:30

## 2018-04-16 RX ADMIN — ACETAMINOPHEN 650 MG: 325 TABLET ORAL at 20:30

## 2018-04-16 RX ADMIN — LATANOPROST 1 DROP: 50 SOLUTION OPHTHALMIC at 20:31

## 2018-04-16 RX ADMIN — VALSARTAN 20 MG: 40 TABLET, FILM COATED ORAL at 09:02

## 2018-04-16 RX ADMIN — ENOXAPARIN SODIUM 30 MG: 30 INJECTION, SOLUTION INTRAVENOUS; SUBCUTANEOUS at 09:02

## 2018-04-16 RX ADMIN — LORATADINE 10 MG: 10 TABLET ORAL at 18:04

## 2018-04-16 RX ADMIN — CYANOCOBALAMIN TAB 500 MCG 1000 MCG: 500 TAB at 09:02

## 2018-04-16 RX ADMIN — CLOPIDOGREL BISULFATE 75 MG: 75 TABLET ORAL at 09:02

## 2018-04-17 PROCEDURE — 74011250637 HC RX REV CODE- 250/637: Performed by: PHYSICAL MEDICINE & REHABILITATION

## 2018-04-17 PROCEDURE — 74011250636 HC RX REV CODE- 250/636: Performed by: PHYSICAL MEDICINE & REHABILITATION

## 2018-04-17 RX ADMIN — CYANOCOBALAMIN TAB 500 MCG 1000 MCG: 500 TAB at 08:38

## 2018-04-17 RX ADMIN — LORATADINE 10 MG: 10 TABLET ORAL at 18:00

## 2018-04-17 RX ADMIN — CLOPIDOGREL BISULFATE 75 MG: 75 TABLET ORAL at 08:38

## 2018-04-17 RX ADMIN — FAMOTIDINE 20 MG: 20 TABLET, FILM COATED ORAL at 08:38

## 2018-04-17 RX ADMIN — FLUCONAZOLE 200 MG: 200 TABLET ORAL at 08:38

## 2018-04-17 RX ADMIN — ACETAMINOPHEN 650 MG: 325 TABLET ORAL at 10:54

## 2018-04-17 RX ADMIN — LATANOPROST 1 DROP: 50 SOLUTION OPHTHALMIC at 21:12

## 2018-04-17 RX ADMIN — ATORVASTATIN CALCIUM 20 MG: 20 TABLET, FILM COATED ORAL at 21:12

## 2018-04-17 RX ADMIN — ASPIRIN 81 MG 81 MG: 81 TABLET ORAL at 08:38

## 2018-04-17 RX ADMIN — ENOXAPARIN SODIUM 30 MG: 30 INJECTION, SOLUTION INTRAVENOUS; SUBCUTANEOUS at 08:38

## 2018-04-17 RX ADMIN — LEVOTHYROXINE SODIUM 25 MCG: 50 TABLET ORAL at 04:21

## 2018-04-17 RX ADMIN — VALSARTAN 20 MG: 40 TABLET, FILM COATED ORAL at 08:38

## 2018-04-18 LAB
BACTERIA SPEC CULT: NORMAL
CC UR VC: NORMAL
SERVICE CMNT-IMP: NORMAL

## 2018-04-18 PROCEDURE — 74011250637 HC RX REV CODE- 250/637: Performed by: PHYSICAL MEDICINE & REHABILITATION

## 2018-04-18 PROCEDURE — 74011250636 HC RX REV CODE- 250/636: Performed by: PHYSICAL MEDICINE & REHABILITATION

## 2018-04-18 RX ADMIN — VALSARTAN 20 MG: 40 TABLET, FILM COATED ORAL at 08:37

## 2018-04-18 RX ADMIN — CYANOCOBALAMIN TAB 500 MCG 1000 MCG: 500 TAB at 08:36

## 2018-04-18 RX ADMIN — ENOXAPARIN SODIUM 30 MG: 30 INJECTION, SOLUTION INTRAVENOUS; SUBCUTANEOUS at 08:36

## 2018-04-18 RX ADMIN — LEVOTHYROXINE SODIUM 25 MCG: 50 TABLET ORAL at 04:34

## 2018-04-18 RX ADMIN — LORATADINE 10 MG: 10 TABLET ORAL at 17:47

## 2018-04-18 RX ADMIN — CLOPIDOGREL BISULFATE 75 MG: 75 TABLET ORAL at 08:37

## 2018-04-18 RX ADMIN — ACETAMINOPHEN 650 MG: 325 TABLET ORAL at 11:38

## 2018-04-18 RX ADMIN — FAMOTIDINE 20 MG: 20 TABLET, FILM COATED ORAL at 08:37

## 2018-04-18 RX ADMIN — ASPIRIN 81 MG 81 MG: 81 TABLET ORAL at 08:37

## 2018-04-18 RX ADMIN — ATORVASTATIN CALCIUM 20 MG: 20 TABLET, FILM COATED ORAL at 20:38

## 2018-04-18 RX ADMIN — LATANOPROST 1 DROP: 50 SOLUTION OPHTHALMIC at 20:38

## 2018-04-18 RX ADMIN — FLUCONAZOLE 200 MG: 200 TABLET ORAL at 08:37

## 2018-04-19 PROCEDURE — 74011250636 HC RX REV CODE- 250/636: Performed by: PHYSICAL MEDICINE & REHABILITATION

## 2018-04-19 PROCEDURE — 74011250637 HC RX REV CODE- 250/637: Performed by: PHYSICAL MEDICINE & REHABILITATION

## 2018-04-19 RX ADMIN — ASPIRIN 81 MG 81 MG: 81 TABLET ORAL at 08:14

## 2018-04-19 RX ADMIN — FLUCONAZOLE 200 MG: 200 TABLET ORAL at 08:15

## 2018-04-19 RX ADMIN — ATORVASTATIN CALCIUM 20 MG: 20 TABLET, FILM COATED ORAL at 20:01

## 2018-04-19 RX ADMIN — CYANOCOBALAMIN TAB 500 MCG 1000 MCG: 500 TAB at 08:14

## 2018-04-19 RX ADMIN — LATANOPROST 1 DROP: 50 SOLUTION OPHTHALMIC at 20:02

## 2018-04-19 RX ADMIN — LORATADINE 10 MG: 10 TABLET ORAL at 17:14

## 2018-04-19 RX ADMIN — LEVOTHYROXINE SODIUM 25 MCG: 50 TABLET ORAL at 06:35

## 2018-04-19 RX ADMIN — FAMOTIDINE 20 MG: 20 TABLET, FILM COATED ORAL at 08:15

## 2018-04-19 RX ADMIN — ENOXAPARIN SODIUM 30 MG: 30 INJECTION, SOLUTION INTRAVENOUS; SUBCUTANEOUS at 08:14

## 2018-04-19 RX ADMIN — CLOPIDOGREL BISULFATE 75 MG: 75 TABLET ORAL at 08:15

## 2018-04-19 RX ADMIN — VALSARTAN 20 MG: 40 TABLET, FILM COATED ORAL at 08:14

## 2018-04-19 RX ADMIN — ACETAMINOPHEN 650 MG: 325 TABLET ORAL at 11:25

## 2018-04-20 ENCOUNTER — APPOINTMENT (OUTPATIENT)
Dept: GENERAL RADIOLOGY | Age: 83
End: 2018-04-20
Attending: PHYSICAL MEDICINE & REHABILITATION

## 2018-04-20 PROCEDURE — 74011250636 HC RX REV CODE- 250/636: Performed by: PHYSICAL MEDICINE & REHABILITATION

## 2018-04-20 PROCEDURE — 73552 X-RAY EXAM OF FEMUR 2/>: CPT

## 2018-04-20 PROCEDURE — 74011250637 HC RX REV CODE- 250/637: Performed by: PHYSICAL MEDICINE & REHABILITATION

## 2018-04-20 PROCEDURE — 73502 X-RAY EXAM HIP UNI 2-3 VIEWS: CPT

## 2018-04-20 RX ORDER — ACETAMINOPHEN 325 MG/1
650 TABLET ORAL 2 TIMES DAILY
Status: DISCONTINUED | OUTPATIENT
Start: 2018-04-20 | End: 2018-05-05 | Stop reason: HOSPADM

## 2018-04-20 RX ADMIN — VALSARTAN 20 MG: 40 TABLET, FILM COATED ORAL at 08:40

## 2018-04-20 RX ADMIN — ACETAMINOPHEN 650 MG: 325 TABLET ORAL at 01:23

## 2018-04-20 RX ADMIN — LEVOTHYROXINE SODIUM 25 MCG: 50 TABLET ORAL at 06:05

## 2018-04-20 RX ADMIN — FLUCONAZOLE 200 MG: 200 TABLET ORAL at 08:41

## 2018-04-20 RX ADMIN — FAMOTIDINE 20 MG: 20 TABLET, FILM COATED ORAL at 08:40

## 2018-04-20 RX ADMIN — CLOPIDOGREL BISULFATE 75 MG: 75 TABLET ORAL at 08:41

## 2018-04-20 RX ADMIN — CYANOCOBALAMIN TAB 500 MCG 1000 MCG: 500 TAB at 08:41

## 2018-04-20 RX ADMIN — ENOXAPARIN SODIUM 30 MG: 30 INJECTION, SOLUTION INTRAVENOUS; SUBCUTANEOUS at 08:40

## 2018-04-20 RX ADMIN — MAGNESIUM HYDROXIDE 30 ML: 400 SUSPENSION ORAL at 16:45

## 2018-04-20 RX ADMIN — ATORVASTATIN CALCIUM 20 MG: 20 TABLET, FILM COATED ORAL at 20:46

## 2018-04-20 RX ADMIN — ASPIRIN 81 MG 81 MG: 81 TABLET ORAL at 08:41

## 2018-04-20 RX ADMIN — LATANOPROST 1 DROP: 50 SOLUTION OPHTHALMIC at 20:47

## 2018-04-20 RX ADMIN — LORATADINE 10 MG: 10 TABLET ORAL at 16:45

## 2018-04-20 RX ADMIN — ACETAMINOPHEN 650 MG: 325 TABLET ORAL at 11:00

## 2018-04-21 PROCEDURE — 74011250637 HC RX REV CODE- 250/637: Performed by: PHYSICAL MEDICINE & REHABILITATION

## 2018-04-21 PROCEDURE — 74011250636 HC RX REV CODE- 250/636: Performed by: PHYSICAL MEDICINE & REHABILITATION

## 2018-04-21 RX ADMIN — LATANOPROST 1 DROP: 50 SOLUTION OPHTHALMIC at 20:15

## 2018-04-21 RX ADMIN — FAMOTIDINE 20 MG: 20 TABLET, FILM COATED ORAL at 08:05

## 2018-04-21 RX ADMIN — CYANOCOBALAMIN TAB 500 MCG 1000 MCG: 500 TAB at 08:05

## 2018-04-21 RX ADMIN — CLOPIDOGREL BISULFATE 75 MG: 75 TABLET ORAL at 08:05

## 2018-04-21 RX ADMIN — ASPIRIN 81 MG 81 MG: 81 TABLET ORAL at 08:05

## 2018-04-21 RX ADMIN — ATORVASTATIN CALCIUM 20 MG: 20 TABLET, FILM COATED ORAL at 20:15

## 2018-04-21 RX ADMIN — FLUCONAZOLE 200 MG: 200 TABLET ORAL at 08:05

## 2018-04-21 RX ADMIN — ACETAMINOPHEN 650 MG: 325 TABLET ORAL at 13:43

## 2018-04-21 RX ADMIN — VALSARTAN 20 MG: 40 TABLET, FILM COATED ORAL at 08:05

## 2018-04-21 RX ADMIN — LEVOTHYROXINE SODIUM 25 MCG: 50 TABLET ORAL at 05:53

## 2018-04-21 RX ADMIN — LORATADINE 10 MG: 10 TABLET ORAL at 17:03

## 2018-04-21 RX ADMIN — ACETAMINOPHEN 650 MG: 325 TABLET ORAL at 08:06

## 2018-04-21 RX ADMIN — ENOXAPARIN SODIUM 30 MG: 30 INJECTION, SOLUTION INTRAVENOUS; SUBCUTANEOUS at 08:05

## 2018-04-22 PROCEDURE — 74011250637 HC RX REV CODE- 250/637: Performed by: PHYSICAL MEDICINE & REHABILITATION

## 2018-04-22 PROCEDURE — 74011250636 HC RX REV CODE- 250/636: Performed by: PHYSICAL MEDICINE & REHABILITATION

## 2018-04-22 RX ADMIN — ENOXAPARIN SODIUM 30 MG: 30 INJECTION, SOLUTION INTRAVENOUS; SUBCUTANEOUS at 08:13

## 2018-04-22 RX ADMIN — LEVOTHYROXINE SODIUM 25 MCG: 50 TABLET ORAL at 06:21

## 2018-04-22 RX ADMIN — CYANOCOBALAMIN TAB 500 MCG 1000 MCG: 500 TAB at 08:14

## 2018-04-22 RX ADMIN — ATORVASTATIN CALCIUM 20 MG: 20 TABLET, FILM COATED ORAL at 19:58

## 2018-04-22 RX ADMIN — VALSARTAN 20 MG: 40 TABLET, FILM COATED ORAL at 08:14

## 2018-04-22 RX ADMIN — LORATADINE 10 MG: 10 TABLET ORAL at 17:03

## 2018-04-22 RX ADMIN — FLUCONAZOLE 200 MG: 200 TABLET ORAL at 08:14

## 2018-04-22 RX ADMIN — ASPIRIN 81 MG 81 MG: 81 TABLET ORAL at 08:14

## 2018-04-22 RX ADMIN — CLOPIDOGREL BISULFATE 75 MG: 75 TABLET ORAL at 08:14

## 2018-04-22 RX ADMIN — ACETAMINOPHEN 650 MG: 325 TABLET ORAL at 08:14

## 2018-04-22 RX ADMIN — LATANOPROST 1 DROP: 50 SOLUTION OPHTHALMIC at 19:58

## 2018-04-22 RX ADMIN — FAMOTIDINE 20 MG: 20 TABLET, FILM COATED ORAL at 08:14

## 2018-04-23 PROCEDURE — 74011250636 HC RX REV CODE- 250/636: Performed by: PHYSICAL MEDICINE & REHABILITATION

## 2018-04-23 PROCEDURE — 74011250637 HC RX REV CODE- 250/637: Performed by: PHYSICAL MEDICINE & REHABILITATION

## 2018-04-23 RX ADMIN — LEVOTHYROXINE SODIUM 25 MCG: 50 TABLET ORAL at 05:36

## 2018-04-23 RX ADMIN — MAGNESIUM HYDROXIDE 30 ML: 400 SUSPENSION ORAL at 17:15

## 2018-04-23 RX ADMIN — ASPIRIN 81 MG 81 MG: 81 TABLET ORAL at 08:07

## 2018-04-23 RX ADMIN — FLUCONAZOLE 200 MG: 200 TABLET ORAL at 08:07

## 2018-04-23 RX ADMIN — ENOXAPARIN SODIUM 30 MG: 30 INJECTION, SOLUTION INTRAVENOUS; SUBCUTANEOUS at 08:08

## 2018-04-23 RX ADMIN — CYANOCOBALAMIN TAB 500 MCG 1000 MCG: 500 TAB at 08:08

## 2018-04-23 RX ADMIN — ACETAMINOPHEN 650 MG: 325 TABLET ORAL at 05:36

## 2018-04-23 RX ADMIN — ATORVASTATIN CALCIUM 20 MG: 20 TABLET, FILM COATED ORAL at 20:41

## 2018-04-23 RX ADMIN — LORATADINE 10 MG: 10 TABLET ORAL at 17:15

## 2018-04-23 RX ADMIN — CLOPIDOGREL BISULFATE 75 MG: 75 TABLET ORAL at 08:08

## 2018-04-23 RX ADMIN — ACETAMINOPHEN 650 MG: 325 TABLET ORAL at 15:30

## 2018-04-23 RX ADMIN — LATANOPROST 1 DROP: 50 SOLUTION OPHTHALMIC at 20:42

## 2018-04-23 RX ADMIN — VALSARTAN 20 MG: 40 TABLET, FILM COATED ORAL at 08:07

## 2018-04-23 RX ADMIN — FAMOTIDINE 20 MG: 20 TABLET, FILM COATED ORAL at 08:07

## 2018-04-24 PROCEDURE — 74011250637 HC RX REV CODE- 250/637: Performed by: PHYSICAL MEDICINE & REHABILITATION

## 2018-04-24 PROCEDURE — 74011250636 HC RX REV CODE- 250/636: Performed by: PHYSICAL MEDICINE & REHABILITATION

## 2018-04-24 RX ADMIN — ACETAMINOPHEN 650 MG: 325 TABLET ORAL at 12:42

## 2018-04-24 RX ADMIN — CLOPIDOGREL BISULFATE 75 MG: 75 TABLET ORAL at 08:32

## 2018-04-24 RX ADMIN — ASPIRIN 81 MG 81 MG: 81 TABLET ORAL at 08:32

## 2018-04-24 RX ADMIN — LORATADINE 10 MG: 10 TABLET ORAL at 17:32

## 2018-04-24 RX ADMIN — FLUCONAZOLE 200 MG: 200 TABLET ORAL at 08:32

## 2018-04-24 RX ADMIN — ATORVASTATIN CALCIUM 20 MG: 20 TABLET, FILM COATED ORAL at 21:07

## 2018-04-24 RX ADMIN — LEVOTHYROXINE SODIUM 25 MCG: 50 TABLET ORAL at 04:56

## 2018-04-24 RX ADMIN — FAMOTIDINE 20 MG: 20 TABLET, FILM COATED ORAL at 08:32

## 2018-04-24 RX ADMIN — LATANOPROST 1 DROP: 50 SOLUTION OPHTHALMIC at 21:07

## 2018-04-24 RX ADMIN — CYANOCOBALAMIN TAB 500 MCG 1000 MCG: 500 TAB at 08:32

## 2018-04-24 RX ADMIN — ENOXAPARIN SODIUM 30 MG: 30 INJECTION, SOLUTION INTRAVENOUS; SUBCUTANEOUS at 08:32

## 2018-04-24 RX ADMIN — ACETAMINOPHEN 650 MG: 325 TABLET ORAL at 08:31

## 2018-04-25 PROCEDURE — 74011250636 HC RX REV CODE- 250/636: Performed by: PHYSICAL MEDICINE & REHABILITATION

## 2018-04-25 PROCEDURE — 74011250637 HC RX REV CODE- 250/637: Performed by: PHYSICAL MEDICINE & REHABILITATION

## 2018-04-25 RX ADMIN — ACETAMINOPHEN 650 MG: 325 TABLET ORAL at 12:30

## 2018-04-25 RX ADMIN — ENOXAPARIN SODIUM 30 MG: 30 INJECTION, SOLUTION INTRAVENOUS; SUBCUTANEOUS at 08:22

## 2018-04-25 RX ADMIN — LORATADINE 10 MG: 10 TABLET ORAL at 17:30

## 2018-04-25 RX ADMIN — FAMOTIDINE 20 MG: 20 TABLET, FILM COATED ORAL at 08:22

## 2018-04-25 RX ADMIN — CLOPIDOGREL BISULFATE 75 MG: 75 TABLET ORAL at 08:22

## 2018-04-25 RX ADMIN — ACETAMINOPHEN 650 MG: 325 TABLET ORAL at 08:22

## 2018-04-25 RX ADMIN — CYANOCOBALAMIN TAB 500 MCG 1000 MCG: 500 TAB at 08:22

## 2018-04-25 RX ADMIN — ATORVASTATIN CALCIUM 20 MG: 20 TABLET, FILM COATED ORAL at 21:53

## 2018-04-25 RX ADMIN — LATANOPROST 1 DROP: 50 SOLUTION OPHTHALMIC at 21:54

## 2018-04-25 RX ADMIN — ASPIRIN 81 MG 81 MG: 81 TABLET ORAL at 08:22

## 2018-04-25 RX ADMIN — LEVOTHYROXINE SODIUM 25 MCG: 50 TABLET ORAL at 05:43

## 2018-04-26 LAB
APPEARANCE UR: CLEAR
BACTERIA URNS QL MICRO: ABNORMAL /HPF
BILIRUB UR QL: NEGATIVE
COLOR UR: ABNORMAL
EPITH CASTS URNS QL MICRO: ABNORMAL /LPF
GLUCOSE UR STRIP.AUTO-MCNC: NEGATIVE MG/DL
HGB UR QL STRIP: NEGATIVE
KETONES UR QL STRIP.AUTO: NEGATIVE MG/DL
LEUKOCYTE ESTERASE UR QL STRIP.AUTO: ABNORMAL
NITRITE UR QL STRIP.AUTO: POSITIVE
PH UR STRIP: 6.5 [PH] (ref 5–8)
PROT UR STRIP-MCNC: NEGATIVE MG/DL
RBC #/AREA URNS HPF: ABNORMAL /HPF (ref 0–5)
SP GR UR REFRACTOMETRY: 1.01 (ref 1–1.03)
UROBILINOGEN UR QL STRIP.AUTO: 0.2 EU/DL (ref 0.2–1)
WBC URNS QL MICRO: ABNORMAL /HPF (ref 0–4)

## 2018-04-26 PROCEDURE — 74011250637 HC RX REV CODE- 250/637: Performed by: PHYSICAL MEDICINE & REHABILITATION

## 2018-04-26 PROCEDURE — 87086 URINE CULTURE/COLONY COUNT: CPT | Performed by: PHYSICAL MEDICINE & REHABILITATION

## 2018-04-26 PROCEDURE — 81001 URINALYSIS AUTO W/SCOPE: CPT | Performed by: PHYSICAL MEDICINE & REHABILITATION

## 2018-04-26 PROCEDURE — 74011250636 HC RX REV CODE- 250/636: Performed by: PHYSICAL MEDICINE & REHABILITATION

## 2018-04-26 RX ORDER — VALSARTAN 40 MG/1
20 TABLET ORAL DAILY
Status: DISCONTINUED | OUTPATIENT
Start: 2018-04-26 | End: 2018-05-05 | Stop reason: HOSPADM

## 2018-04-26 RX ORDER — NITROFURANTOIN 25; 75 MG/1; MG/1
100 CAPSULE ORAL EVERY 12 HOURS
Status: COMPLETED | OUTPATIENT
Start: 2018-04-26 | End: 2018-05-02

## 2018-04-26 RX ADMIN — NITROFURANTOIN MONOHYDRATE/MACROCRYSTALLINE 100 MG: 25; 75 CAPSULE ORAL at 12:39

## 2018-04-26 RX ADMIN — ENOXAPARIN SODIUM 30 MG: 30 INJECTION, SOLUTION INTRAVENOUS; SUBCUTANEOUS at 08:41

## 2018-04-26 RX ADMIN — LATANOPROST 1 DROP: 50 SOLUTION OPHTHALMIC at 21:13

## 2018-04-26 RX ADMIN — ASPIRIN 81 MG 81 MG: 81 TABLET ORAL at 08:41

## 2018-04-26 RX ADMIN — ATORVASTATIN CALCIUM 20 MG: 20 TABLET, FILM COATED ORAL at 21:12

## 2018-04-26 RX ADMIN — ACETAMINOPHEN 650 MG: 325 TABLET ORAL at 08:41

## 2018-04-26 RX ADMIN — VALSARTAN 20 MG: 40 TABLET, FILM COATED ORAL at 12:39

## 2018-04-26 RX ADMIN — LEVOTHYROXINE SODIUM 25 MCG: 50 TABLET ORAL at 06:33

## 2018-04-26 RX ADMIN — ACETAMINOPHEN 650 MG: 325 TABLET ORAL at 12:39

## 2018-04-26 RX ADMIN — CLOPIDOGREL BISULFATE 75 MG: 75 TABLET ORAL at 08:41

## 2018-04-26 RX ADMIN — NITROFURANTOIN MONOHYDRATE/MACROCRYSTALLINE 100 MG: 25; 75 CAPSULE ORAL at 21:12

## 2018-04-26 RX ADMIN — ACETAMINOPHEN 650 MG: 325 TABLET ORAL at 19:24

## 2018-04-26 RX ADMIN — FAMOTIDINE 20 MG: 20 TABLET, FILM COATED ORAL at 08:41

## 2018-04-26 RX ADMIN — CYANOCOBALAMIN TAB 500 MCG 1000 MCG: 500 TAB at 08:41

## 2018-04-26 RX ADMIN — LORATADINE 10 MG: 10 TABLET ORAL at 17:22

## 2018-04-27 VITALS
WEIGHT: 121 LBS | BODY MASS INDEX: 20.66 KG/M2 | SYSTOLIC BLOOD PRESSURE: 118 MMHG | HEART RATE: 81 BPM | HEIGHT: 64 IN | DIASTOLIC BLOOD PRESSURE: 70 MMHG

## 2018-04-27 LAB
BACTERIA SPEC CULT: NORMAL
CC UR VC: NORMAL
SERVICE CMNT-IMP: NORMAL

## 2018-04-27 PROCEDURE — 74011000250 HC RX REV CODE- 250: Performed by: PHYSICAL MEDICINE & REHABILITATION

## 2018-04-27 PROCEDURE — 74011250637 HC RX REV CODE- 250/637: Performed by: PHYSICAL MEDICINE & REHABILITATION

## 2018-04-27 PROCEDURE — 74011250636 HC RX REV CODE- 250/636: Performed by: PHYSICAL MEDICINE & REHABILITATION

## 2018-04-27 RX ADMIN — NITROFURANTOIN MONOHYDRATE/MACROCRYSTALLINE 100 MG: 25; 75 CAPSULE ORAL at 21:00

## 2018-04-27 RX ADMIN — ASPIRIN 81 MG 81 MG: 81 TABLET ORAL at 08:09

## 2018-04-27 RX ADMIN — ACETAMINOPHEN 650 MG: 325 TABLET ORAL at 08:10

## 2018-04-27 RX ADMIN — ACETAMINOPHEN 650 MG: 325 TABLET ORAL at 12:27

## 2018-04-27 RX ADMIN — CLOPIDOGREL BISULFATE 75 MG: 75 TABLET ORAL at 08:09

## 2018-04-27 RX ADMIN — ATORVASTATIN CALCIUM 20 MG: 20 TABLET, FILM COATED ORAL at 21:00

## 2018-04-27 RX ADMIN — ENOXAPARIN SODIUM 30 MG: 30 INJECTION, SOLUTION INTRAVENOUS; SUBCUTANEOUS at 08:09

## 2018-04-27 RX ADMIN — LATANOPROST 1 DROP: 50 SOLUTION OPHTHALMIC at 21:20

## 2018-04-27 RX ADMIN — NITROFURANTOIN MONOHYDRATE/MACROCRYSTALLINE 100 MG: 25; 75 CAPSULE ORAL at 08:09

## 2018-04-27 RX ADMIN — LEVOTHYROXINE SODIUM 25 MCG: 50 TABLET ORAL at 06:57

## 2018-04-27 RX ADMIN — CYANOCOBALAMIN TAB 500 MCG 1000 MCG: 500 TAB at 08:09

## 2018-04-27 RX ADMIN — FAMOTIDINE 20 MG: 20 TABLET, FILM COATED ORAL at 08:10

## 2018-04-27 RX ADMIN — VALSARTAN 20 MG: 40 TABLET, FILM COATED ORAL at 08:10

## 2018-04-27 RX ADMIN — LORATADINE 10 MG: 10 TABLET ORAL at 17:14

## 2018-04-28 PROCEDURE — 74011250637 HC RX REV CODE- 250/637: Performed by: PHYSICAL MEDICINE & REHABILITATION

## 2018-04-28 PROCEDURE — 74011250636 HC RX REV CODE- 250/636: Performed by: PHYSICAL MEDICINE & REHABILITATION

## 2018-04-28 RX ORDER — ADHESIVE BANDAGE
30 BANDAGE TOPICAL
Status: ACTIVE | OUTPATIENT
Start: 2018-04-28 | End: 2018-04-28

## 2018-04-28 RX ADMIN — CLOPIDOGREL BISULFATE 75 MG: 75 TABLET ORAL at 08:17

## 2018-04-28 RX ADMIN — VALSARTAN 20 MG: 40 TABLET, FILM COATED ORAL at 08:17

## 2018-04-28 RX ADMIN — CYANOCOBALAMIN TAB 500 MCG 1000 MCG: 500 TAB at 08:17

## 2018-04-28 RX ADMIN — LATANOPROST 1 DROP: 50 SOLUTION OPHTHALMIC at 21:13

## 2018-04-28 RX ADMIN — ENOXAPARIN SODIUM 30 MG: 30 INJECTION, SOLUTION INTRAVENOUS; SUBCUTANEOUS at 08:16

## 2018-04-28 RX ADMIN — LORATADINE 10 MG: 10 TABLET ORAL at 17:11

## 2018-04-28 RX ADMIN — LEVOTHYROXINE SODIUM 25 MCG: 50 TABLET ORAL at 06:56

## 2018-04-28 RX ADMIN — FAMOTIDINE 20 MG: 20 TABLET, FILM COATED ORAL at 08:16

## 2018-04-28 RX ADMIN — ACETAMINOPHEN 650 MG: 325 TABLET ORAL at 08:16

## 2018-04-28 RX ADMIN — ATORVASTATIN CALCIUM 20 MG: 20 TABLET, FILM COATED ORAL at 21:13

## 2018-04-28 RX ADMIN — ASPIRIN 81 MG 81 MG: 81 TABLET ORAL at 08:17

## 2018-04-28 RX ADMIN — NITROFURANTOIN MONOHYDRATE/MACROCRYSTALLINE 100 MG: 25; 75 CAPSULE ORAL at 21:13

## 2018-04-28 RX ADMIN — NITROFURANTOIN MONOHYDRATE/MACROCRYSTALLINE 100 MG: 25; 75 CAPSULE ORAL at 08:17

## 2018-04-28 RX ADMIN — ACETAMINOPHEN 650 MG: 325 TABLET ORAL at 12:16

## 2018-04-29 PROCEDURE — 74011250637 HC RX REV CODE- 250/637: Performed by: PHYSICAL MEDICINE & REHABILITATION

## 2018-04-29 PROCEDURE — 74011250636 HC RX REV CODE- 250/636: Performed by: PHYSICAL MEDICINE & REHABILITATION

## 2018-04-29 RX ADMIN — LORATADINE 10 MG: 10 TABLET ORAL at 17:19

## 2018-04-29 RX ADMIN — CLOPIDOGREL BISULFATE 75 MG: 75 TABLET ORAL at 08:14

## 2018-04-29 RX ADMIN — LATANOPROST 1 DROP: 50 SOLUTION OPHTHALMIC at 21:22

## 2018-04-29 RX ADMIN — ACETAMINOPHEN 650 MG: 325 TABLET ORAL at 11:43

## 2018-04-29 RX ADMIN — FAMOTIDINE 20 MG: 20 TABLET, FILM COATED ORAL at 08:14

## 2018-04-29 RX ADMIN — ATORVASTATIN CALCIUM 20 MG: 20 TABLET, FILM COATED ORAL at 21:22

## 2018-04-29 RX ADMIN — CYANOCOBALAMIN TAB 500 MCG 1000 MCG: 500 TAB at 08:14

## 2018-04-29 RX ADMIN — LEVOTHYROXINE SODIUM 25 MCG: 50 TABLET ORAL at 07:03

## 2018-04-29 RX ADMIN — ASPIRIN 81 MG 81 MG: 81 TABLET ORAL at 08:14

## 2018-04-29 RX ADMIN — VALSARTAN 20 MG: 40 TABLET, FILM COATED ORAL at 08:13

## 2018-04-29 RX ADMIN — NITROFURANTOIN MONOHYDRATE/MACROCRYSTALLINE 100 MG: 25; 75 CAPSULE ORAL at 08:14

## 2018-04-29 RX ADMIN — ACETAMINOPHEN 650 MG: 325 TABLET ORAL at 08:14

## 2018-04-29 RX ADMIN — NITROFURANTOIN MONOHYDRATE/MACROCRYSTALLINE 100 MG: 25; 75 CAPSULE ORAL at 21:22

## 2018-04-29 RX ADMIN — ENOXAPARIN SODIUM 30 MG: 30 INJECTION, SOLUTION INTRAVENOUS; SUBCUTANEOUS at 08:13

## 2018-04-30 PROCEDURE — 74011250637 HC RX REV CODE- 250/637: Performed by: PHYSICAL MEDICINE & REHABILITATION

## 2018-04-30 PROCEDURE — 74011250636 HC RX REV CODE- 250/636: Performed by: PHYSICAL MEDICINE & REHABILITATION

## 2018-04-30 RX ORDER — LIDOCAINE 50 MG/G
1 PATCH TOPICAL EVERY 24 HOURS
Status: DISCONTINUED | OUTPATIENT
Start: 2018-05-01 | End: 2018-05-05 | Stop reason: HOSPADM

## 2018-04-30 RX ORDER — LIDOCAINE 50 MG/G
1 PATCH TOPICAL ONCE
Status: COMPLETED | OUTPATIENT
Start: 2018-04-30 | End: 2018-05-01

## 2018-04-30 RX ORDER — OXYBUTYNIN CHLORIDE 5 MG/1
5 TABLET, EXTENDED RELEASE ORAL
Status: DISCONTINUED | OUTPATIENT
Start: 2018-04-30 | End: 2018-05-05 | Stop reason: HOSPADM

## 2018-04-30 RX ADMIN — ASPIRIN 81 MG 81 MG: 81 TABLET ORAL at 09:10

## 2018-04-30 RX ADMIN — ATORVASTATIN CALCIUM 20 MG: 20 TABLET, FILM COATED ORAL at 21:55

## 2018-04-30 RX ADMIN — CLOPIDOGREL BISULFATE 75 MG: 75 TABLET ORAL at 09:09

## 2018-04-30 RX ADMIN — LORATADINE 10 MG: 10 TABLET ORAL at 17:48

## 2018-04-30 RX ADMIN — ENOXAPARIN SODIUM 30 MG: 30 INJECTION, SOLUTION INTRAVENOUS; SUBCUTANEOUS at 09:09

## 2018-04-30 RX ADMIN — NITROFURANTOIN MONOHYDRATE/MACROCRYSTALLINE 100 MG: 25; 75 CAPSULE ORAL at 09:10

## 2018-04-30 RX ADMIN — FAMOTIDINE 20 MG: 20 TABLET, FILM COATED ORAL at 09:09

## 2018-04-30 RX ADMIN — LATANOPROST 1 DROP: 50 SOLUTION OPHTHALMIC at 21:56

## 2018-04-30 RX ADMIN — CYANOCOBALAMIN TAB 500 MCG 1000 MCG: 500 TAB at 09:10

## 2018-04-30 RX ADMIN — ACETAMINOPHEN 650 MG: 325 TABLET ORAL at 12:29

## 2018-04-30 RX ADMIN — VALSARTAN 20 MG: 40 TABLET, FILM COATED ORAL at 09:09

## 2018-04-30 RX ADMIN — LEVOTHYROXINE SODIUM 25 MCG: 50 TABLET ORAL at 05:51

## 2018-04-30 RX ADMIN — OXYBUTYNIN CHLORIDE 5 MG: 5 TABLET, FILM COATED, EXTENDED RELEASE ORAL at 21:55

## 2018-04-30 RX ADMIN — NITROFURANTOIN MONOHYDRATE/MACROCRYSTALLINE 100 MG: 25; 75 CAPSULE ORAL at 21:55

## 2018-04-30 RX ADMIN — ACETAMINOPHEN 650 MG: 325 TABLET ORAL at 09:09

## 2018-05-01 PROCEDURE — 74011250636 HC RX REV CODE- 250/636: Performed by: PHYSICAL MEDICINE & REHABILITATION

## 2018-05-01 PROCEDURE — 74011250637 HC RX REV CODE- 250/637: Performed by: PHYSICAL MEDICINE & REHABILITATION

## 2018-05-01 RX ADMIN — FAMOTIDINE 20 MG: 20 TABLET, FILM COATED ORAL at 08:48

## 2018-05-01 RX ADMIN — OXYBUTYNIN CHLORIDE 5 MG: 5 TABLET, FILM COATED, EXTENDED RELEASE ORAL at 21:56

## 2018-05-01 RX ADMIN — NITROFURANTOIN MONOHYDRATE/MACROCRYSTALLINE 100 MG: 25; 75 CAPSULE ORAL at 08:47

## 2018-05-01 RX ADMIN — CLOPIDOGREL BISULFATE 75 MG: 75 TABLET ORAL at 08:47

## 2018-05-01 RX ADMIN — LORATADINE 10 MG: 10 TABLET ORAL at 17:04

## 2018-05-01 RX ADMIN — ATORVASTATIN CALCIUM 20 MG: 20 TABLET, FILM COATED ORAL at 21:56

## 2018-05-01 RX ADMIN — ACETAMINOPHEN 650 MG: 325 TABLET ORAL at 12:20

## 2018-05-01 RX ADMIN — LATANOPROST 1 DROP: 50 SOLUTION OPHTHALMIC at 21:56

## 2018-05-01 RX ADMIN — LEVOTHYROXINE SODIUM 25 MCG: 50 TABLET ORAL at 05:57

## 2018-05-01 RX ADMIN — ENOXAPARIN SODIUM 30 MG: 30 INJECTION, SOLUTION INTRAVENOUS; SUBCUTANEOUS at 08:47

## 2018-05-01 RX ADMIN — NITROFURANTOIN MONOHYDRATE/MACROCRYSTALLINE 100 MG: 25; 75 CAPSULE ORAL at 21:56

## 2018-05-01 RX ADMIN — ASPIRIN 81 MG 81 MG: 81 TABLET ORAL at 08:48

## 2018-05-01 RX ADMIN — CYANOCOBALAMIN TAB 500 MCG 1000 MCG: 500 TAB at 08:47

## 2018-05-01 RX ADMIN — ACETAMINOPHEN 650 MG: 325 TABLET ORAL at 08:48

## 2018-05-02 PROCEDURE — 74011250636 HC RX REV CODE- 250/636: Performed by: PHYSICAL MEDICINE & REHABILITATION

## 2018-05-02 PROCEDURE — 74011250637 HC RX REV CODE- 250/637: Performed by: PHYSICAL MEDICINE & REHABILITATION

## 2018-05-02 RX ADMIN — ENOXAPARIN SODIUM 30 MG: 30 INJECTION, SOLUTION INTRAVENOUS; SUBCUTANEOUS at 08:37

## 2018-05-02 RX ADMIN — NITROFURANTOIN MONOHYDRATE/MACROCRYSTALLINE 100 MG: 25; 75 CAPSULE ORAL at 21:13

## 2018-05-02 RX ADMIN — ACETAMINOPHEN 650 MG: 325 TABLET ORAL at 12:43

## 2018-05-02 RX ADMIN — LATANOPROST 1 DROP: 50 SOLUTION OPHTHALMIC at 21:15

## 2018-05-02 RX ADMIN — LEVOTHYROXINE SODIUM 25 MCG: 50 TABLET ORAL at 06:15

## 2018-05-02 RX ADMIN — LORATADINE 10 MG: 10 TABLET ORAL at 17:32

## 2018-05-02 RX ADMIN — ACETAMINOPHEN 650 MG: 325 TABLET ORAL at 08:37

## 2018-05-02 RX ADMIN — OXYBUTYNIN CHLORIDE 5 MG: 5 TABLET, FILM COATED, EXTENDED RELEASE ORAL at 21:13

## 2018-05-02 RX ADMIN — CYANOCOBALAMIN TAB 500 MCG 1000 MCG: 500 TAB at 08:38

## 2018-05-02 RX ADMIN — CLOPIDOGREL BISULFATE 75 MG: 75 TABLET ORAL at 08:37

## 2018-05-02 RX ADMIN — NITROFURANTOIN MONOHYDRATE/MACROCRYSTALLINE 100 MG: 25; 75 CAPSULE ORAL at 08:38

## 2018-05-02 RX ADMIN — FAMOTIDINE 20 MG: 20 TABLET, FILM COATED ORAL at 08:37

## 2018-05-02 RX ADMIN — ASPIRIN 81 MG 81 MG: 81 TABLET ORAL at 08:38

## 2018-05-02 RX ADMIN — VALSARTAN 20 MG: 40 TABLET, FILM COATED ORAL at 08:37

## 2018-05-02 RX ADMIN — ATORVASTATIN CALCIUM 20 MG: 20 TABLET, FILM COATED ORAL at 21:13

## 2018-05-03 PROCEDURE — 74011250637 HC RX REV CODE- 250/637: Performed by: PHYSICAL MEDICINE & REHABILITATION

## 2018-05-03 PROCEDURE — 74011250636 HC RX REV CODE- 250/636: Performed by: PHYSICAL MEDICINE & REHABILITATION

## 2018-05-03 RX ADMIN — ACETAMINOPHEN 650 MG: 325 TABLET ORAL at 12:36

## 2018-05-03 RX ADMIN — ENOXAPARIN SODIUM 30 MG: 30 INJECTION, SOLUTION INTRAVENOUS; SUBCUTANEOUS at 08:19

## 2018-05-03 RX ADMIN — ATORVASTATIN CALCIUM 20 MG: 20 TABLET, FILM COATED ORAL at 21:43

## 2018-05-03 RX ADMIN — CYANOCOBALAMIN TAB 500 MCG 1000 MCG: 500 TAB at 08:20

## 2018-05-03 RX ADMIN — ACETAMINOPHEN 650 MG: 325 TABLET ORAL at 08:19

## 2018-05-03 RX ADMIN — ASPIRIN 81 MG 81 MG: 81 TABLET ORAL at 08:20

## 2018-05-03 RX ADMIN — VALSARTAN 20 MG: 40 TABLET, FILM COATED ORAL at 08:20

## 2018-05-03 RX ADMIN — ACETAMINOPHEN 325 MG: 325 TABLET ORAL at 21:49

## 2018-05-03 RX ADMIN — FAMOTIDINE 20 MG: 20 TABLET, FILM COATED ORAL at 08:20

## 2018-05-03 RX ADMIN — LATANOPROST 1 DROP: 50 SOLUTION OPHTHALMIC at 21:43

## 2018-05-03 RX ADMIN — LORATADINE 10 MG: 10 TABLET ORAL at 17:00

## 2018-05-03 RX ADMIN — CLOPIDOGREL BISULFATE 75 MG: 75 TABLET ORAL at 08:20

## 2018-05-03 RX ADMIN — OXYBUTYNIN CHLORIDE 5 MG: 5 TABLET, FILM COATED, EXTENDED RELEASE ORAL at 21:43

## 2018-05-04 PROCEDURE — 74011250637 HC RX REV CODE- 250/637: Performed by: PHYSICAL MEDICINE & REHABILITATION

## 2018-05-04 PROCEDURE — 74011250636 HC RX REV CODE- 250/636: Performed by: PHYSICAL MEDICINE & REHABILITATION

## 2018-05-04 RX ADMIN — FAMOTIDINE 20 MG: 20 TABLET, FILM COATED ORAL at 08:19

## 2018-05-04 RX ADMIN — LEVOTHYROXINE SODIUM 25 MCG: 50 TABLET ORAL at 06:18

## 2018-05-04 RX ADMIN — ACETAMINOPHEN 650 MG: 325 TABLET ORAL at 12:13

## 2018-05-04 RX ADMIN — ASPIRIN 81 MG 81 MG: 81 TABLET ORAL at 08:19

## 2018-05-04 RX ADMIN — CLOPIDOGREL BISULFATE 75 MG: 75 TABLET ORAL at 08:19

## 2018-05-04 RX ADMIN — CYANOCOBALAMIN TAB 500 MCG 1000 MCG: 500 TAB at 08:20

## 2018-05-04 RX ADMIN — VALSARTAN 20 MG: 40 TABLET, FILM COATED ORAL at 08:19

## 2018-05-04 RX ADMIN — ENOXAPARIN SODIUM 30 MG: 30 INJECTION, SOLUTION INTRAVENOUS; SUBCUTANEOUS at 08:18

## 2018-05-04 RX ADMIN — LORATADINE 10 MG: 10 TABLET ORAL at 17:37

## 2018-05-04 RX ADMIN — ACETAMINOPHEN 650 MG: 325 TABLET ORAL at 08:19

## 2018-05-04 RX ADMIN — ATORVASTATIN CALCIUM 20 MG: 20 TABLET, FILM COATED ORAL at 20:54

## 2018-05-04 RX ADMIN — OXYBUTYNIN CHLORIDE 5 MG: 5 TABLET, FILM COATED, EXTENDED RELEASE ORAL at 20:54

## 2018-05-04 RX ADMIN — LATANOPROST 1 DROP: 50 SOLUTION OPHTHALMIC at 20:53

## 2018-05-05 PROCEDURE — 74011250636 HC RX REV CODE- 250/636: Performed by: PHYSICAL MEDICINE & REHABILITATION

## 2018-05-05 PROCEDURE — 74011250637 HC RX REV CODE- 250/637: Performed by: PHYSICAL MEDICINE & REHABILITATION

## 2018-05-05 RX ADMIN — CYANOCOBALAMIN TAB 500 MCG 1000 MCG: 500 TAB at 08:37

## 2018-05-05 RX ADMIN — FAMOTIDINE 20 MG: 20 TABLET, FILM COATED ORAL at 08:37

## 2018-05-05 RX ADMIN — LEVOTHYROXINE SODIUM 25 MCG: 50 TABLET ORAL at 05:21

## 2018-05-05 RX ADMIN — ACETAMINOPHEN 650 MG: 325 TABLET ORAL at 05:21

## 2018-05-05 RX ADMIN — CLOPIDOGREL BISULFATE 75 MG: 75 TABLET ORAL at 08:37

## 2018-05-05 RX ADMIN — ASPIRIN 81 MG 81 MG: 81 TABLET ORAL at 08:37

## 2018-05-05 RX ADMIN — ACETAMINOPHEN 650 MG: 325 TABLET ORAL at 08:37

## 2018-05-05 RX ADMIN — ENOXAPARIN SODIUM 30 MG: 30 INJECTION, SOLUTION INTRAVENOUS; SUBCUTANEOUS at 08:37

## 2018-10-11 ENCOUNTER — OFFICE VISIT (OUTPATIENT)
Dept: CARDIOLOGY CLINIC | Age: 83
End: 2018-10-11

## 2018-10-11 VITALS
RESPIRATION RATE: 18 BRPM | HEART RATE: 72 BPM | SYSTOLIC BLOOD PRESSURE: 120 MMHG | HEIGHT: 64 IN | OXYGEN SATURATION: 97 % | DIASTOLIC BLOOD PRESSURE: 70 MMHG | BODY MASS INDEX: 22.2 KG/M2 | WEIGHT: 130 LBS

## 2018-10-11 DIAGNOSIS — I63.81 CEREBROVASCULAR ACCIDENT (CVA) DUE TO OCCLUSION OF SMALL ARTERY (HCC): Primary | ICD-10-CM

## 2018-10-11 RX ORDER — LEVOTHYROXINE SODIUM 25 UG/1
TABLET ORAL
COMMUNITY
End: 2018-11-12 | Stop reason: SDUPTHER

## 2018-10-11 RX ORDER — FAMOTIDINE 20 MG/1
20 TABLET, FILM COATED ORAL 2 TIMES DAILY
COMMUNITY
End: 2020-07-22 | Stop reason: DRUGHIGH

## 2018-10-11 RX ORDER — OXYBUTYNIN CHLORIDE 5 MG/1
5 TABLET ORAL 3 TIMES DAILY
COMMUNITY
End: 2018-10-11 | Stop reason: ALTCHOICE

## 2018-10-11 RX ORDER — ASPIRIN 81 MG/1
TABLET ORAL DAILY
COMMUNITY
End: 2022-11-02 | Stop reason: ALTCHOICE

## 2018-10-11 RX ORDER — LOSARTAN POTASSIUM 25 MG/1
TABLET ORAL DAILY
COMMUNITY
End: 2019-06-05 | Stop reason: SDUPTHER

## 2018-10-11 RX ORDER — LANOLIN ALCOHOL/MO/W.PET/CERES
1000 CREAM (GRAM) TOPICAL DAILY
COMMUNITY

## 2018-10-11 NOTE — PROGRESS NOTES
HISTORY OF PRESENT ILLNESS  Trevor Castillo is a 80 y.o. female     SUMMARY:   Problem List  Date Reviewed: 10/10/2018          Codes Class Noted    Chest pain, unspecified ICD-10-CM: R07.9  ICD-9-CM: 786.50  12/27/2011        Cancer ([facility] [address] ICD-10-CM: C80.1  ICD-9-CM: 199.1  Unknown    Overview Signed 11/3/2011  2:09 PM by Lizett Blunt MD     bladder             Calculus of kidney ICD-10-CM: N20.0  ICD-9-CM: 592.0  Unknown              Current Outpatient Prescriptions on File Prior to Visit   Medication Sig    clopidogrel (PLAVIX) 75 mg tab TAKE 1 TABLET BY MOUTH EVERY DAY.  atorvastatin (LIPITOR) 20 mg tablet TAKE ONE TABLET BY MOUTH EVERY NIGHT AT BEDTIME.  latanoprost (XALATAN) 0.005 % ophthalmic solution     loratadine-pseudoephedrine (CLARITIN-D 12 HOUR) 5-120 mg per tablet Take 1 Tab by mouth two (2) times daily as needed.  potassium chloride (KLOR-CON) 10 mEq tablet Take 1 Tab by mouth daily.  levothyroxine (SYNTHROID) 25 mcg tablet TAKE ONE TABLET BY MOUTH ONCE DAILY    conjugated estrogens (PREMARIN) 0.625 mg tablet TAKE (1) TABLET DAILY.  fluorouracil (EFUDEX) 5 % chemo cream Apply a thin layer to the pink areas on each upper arm twice daily for 4 weeks.  triamcinolone acetonide (KENALOG) 0.1 % ointment Apply  to affected area two (2) times a day. use thin layer    Biotin 2,500 mcg cap Take  by mouth.  cholecalciferol, vitamin D3, (VITAMIN D3) 2,000 unit tab Take 1,000 Units by mouth.  FOLIC ACID/MULTIVITS-MIN/LUT (CENTRUM SILVER PO) Take  by mouth.  diazepam (VALIUM) 2 mg tablet Take  by mouth every six (6) hours as needed for Anxiety. No current facility-administered medications on file prior to visit. CARDIOLOGY STUDIES TO DATE:  None      Chief Complaint   Patient presents with    Stroke     HPI :  Last March when Ms. 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 She recently saw Dr. Ruth Ann Hall at [facility] because of concerns about normal pressure hydrocephalus as the cause of some of her ongoing balance and gait issues. He was concerned that her stroke might have been caused by atrial fibrillation. CARDIAC ROS:   negative for chest pain, dyspnea, palpitations, syncope, orthopnea, paroxysmal nocturnal dyspnea, exertional chest pressure/discomfort, claudication, lower extremity edema    Family History   Problem Relation Age of Onset    Stroke Mother     Heart Disease Father      heart attack in 66's       Past Medical History:   Diagnosis Date    Calculus of kidney     Cancer ([facility] [address]     bladder    Environmental allergies     History of actinic keratoses     Sun-damaged skin     Sunburn, blistering        GENERAL ROS:  A comprehensive review of systems was negative except for that written in the HPI. Visit Vitals    /70 (BP 1 Location: Left arm, BP Patient Position: Sitting)    Pulse 72    Resp 18    Ht 5' 4\" (1.626 m)    Wt 130 lb (59 kg)    SpO2 97%    BMI 22.31 kg/m2       Wt Readings from Last 3 Encounters:   10/11/18 130 lb (59 kg)   03/30/18 121 lb (54.9 kg)   12/28/17 121 lb 0.5 oz (54.9 kg)            BP Readings from Last 3 Encounters:   10/11/18 120/70   04/27/18 118/70   01/04/18 136/82       PHYSICAL EXAM  General appearance: alert, cooperative, no distress, appears stated age  Neurologic: Alert and oriented X 3  Neck: supple, symmetrical, trachea midline, no adenopathy, no carotid bruit and no JVD  Lungs: clear to auscultation bilaterally  Heart: regular rate and rhythm, S1, S2 normal, no murmur, click, rub or gallop  Extremities: extremities normal, atraumatic, no cyanosis or edema      ASSESSMENT  I think it is very unlikely that Ms. Vaughn's stroke was caused by atrial fibrillation.   She was in the hospital for an extended time in [place] on a monitor and atrial fibrillation was never mentioned by any of her physicians including her neurologist.  Mr. Katya Ogden is quite familiar with atrial fibrillation, since he has it and is on Coumadin and again there was never any talk about her going on Coumadin. I also think it would be unlikely that if she had an embolic stroke of that sort that she would have had such a dramatic improvement without more intervention. The only thing we could possibly do is put a 30 day event monitor on her, but I do not think that it likely to be productive given her circumstances. I am going to call Dr. Sue Espinoza per his request to make sure that I understand what the question is.           current treatment plan is effective, no change in therapy  lab results and schedule of future lab studies reviewed with patient  reviewed diet, exercise and weight control    Encounter Diagnoses   Name Primary? 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     Fransico Coffey MD  10/11/2018

## 2018-10-11 NOTE — MR AVS SNAPSHOT
[address] [facility] 
[phone] Patient: Jewels Arana MRN: [id number]:[id number] Visit Information Date & Time Provider Department Dept. Phone Encounter #  
 10/11/2018  1:40 PM Roger Chang MD [facility] Aaron Kendall [phone] [id number] Follow-up Instructions Return in about 1 year (around 10/11/2019). Upcoming Health Maintenance Date Due COLONOSCOPY 7/12/1950 Shingrix Vaccine Age 50> (1 of 2) 7/12/1982 GLAUCOMA SCREENING Q2Y 7/12/1997 Pneumococcal 65+ High/Highest Risk (2 of 2 - PPSV23) 11/5/2007 DTaP/Tdap/Td series (1 - Tdap) 9/15/2016 Influenza Age 5 to Adult 8/1/2018 Allergies as of 10/11/2018  Review Complete On: 10/11/2018 By: Roger Chang MD  
  
 Severity Noted Reaction Type Reactions Latex  03/30/2018    Rash Lemon  12/27/2011    Unknown (comments) Headache and close throat Novocain [Procaine]  09/05/2011    Anaphylaxis Caused her to faint Current Immunizations  Reviewed on 1/8/2018 Name Date Influenza High Dose Vaccine PF 10/26/2017, 9/14/2016 Influenza Vaccine 11/14/2013 Influenza Vaccine Split 11/3/2011 Td, Adsorbed PF 9/14/2016 ZZZ-RETIRED (DO NOT USE) Pneumococcal Vaccine (Unspecified Type) 11/5/2002 Not reviewed this visit You Were Diagnosed With   
  
 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  
 120/70 (BP 1 Location: Left arm, BP Patient Position: Sitting) 72 18 5' 4\" (1.626 m) 130 lb (59 kg) 97% BMI OB Status Smoking Status 22.31 kg/m2 Hysterectomy Former Smoker Vitals History BMI and BSA Data Body Mass Index Body Surface Area  
 22.31 kg/m 2 1.63 m 2 Preferred Pharmacy Pharmacy Name Phone [address] [facility] [phone] Your Updated Medication List  
  
   
This list is accurate as of 10/11/18  3:28 PM.  Always use your most recent med list.  
  
  
  
  
 aspirin delayed-release 81 mg tablet Take  by mouth daily. atorvastatin 20 mg tablet Commonly known as:  LIPITOR  
TAKE ONE TABLET BY MOUTH EVERY NIGHT AT BEDTIME. CLARITIN-D 12 HOUR 5-120 mg per tablet Generic drug:  loratadine-pseudoephedrine Take 1 Tab by mouth two (2) times daily as needed. clopidogrel 75 mg Tab Commonly known as:  PLAVIX TAKE 1 TABLET BY MOUTH EVERY DAY. latanoprost 0.005 % ophthalmic solution Commonly known as:  XALATAN  
  
 levothyroxine 25 mcg tablet Commonly known as:  SYNTHROID Take  by mouth Daily (before breakfast). losartan 25 mg tablet Commonly known as:  COZAAR Take  by mouth daily. PEPCID 20 mg tablet Generic drug:  famotidine Take 20 mg by mouth two (2) times a day. VITAMIN B-12 1,000 mcg tablet Generic drug:  cyanocobalamin Take 1,000 mcg by mouth daily. Follow-up Instructions Return in about 1 year (around 10/11/2019). Introducing [facility]! [employer] introduces [employer]low patient portal. Now you can access parts of your medical record, email your doctor's office, and request medication refills online. 1. In your internet browser, go to [email or web address] 2. Click on the First Time User? Click Here link in the Sign In box. You will see the New Member Sign Up page. 3. Enter your Soflow Access Code exactly as it appears below. You will not need to use this code after youve completed the sign-up process. If you do not sign up before the expiration date, you must request a new code. · Soflow Access Code: [email or web address] Expires: 1/9/2019  2:19 PM 
 
 4. Enter the last four digits of your Social Security Number (xxxx) and Date of Birth (mm/dd/yyyy) as indicated and click Submit. You will be taken to the next sign-up page. 5. Create a Coursera ID. This will be your Coursera login ID and cannot be changed, so think of one that is secure and easy to remember. 6. Create a Coursera password. You can change your password at any time. 7. Enter your Password Reset Question and Answer. This can be used at a later time if you forget your password. 8. Enter your e-mail address. You will receive e-mail notification when new information is available in [address] 9. Click Sign Up. You can now view and download portions of your medical record. 10. Click the Download Summary menu link to download a portable copy of your medical information. If you have questions, please visit the Frequently Asked Questions section of the Coursera website. Remember, Coursera is NOT to be used for urgent needs. For medical emergencies, dial 911. Now available from your iPhone and Android! Please provide this summary of care documentation to your next provider. Your primary care clinician is listed as Dominick Cervantes. If you have any questions after today's visit, please call [phone].

## 2018-10-17 ENCOUNTER — OFFICE VISIT (OUTPATIENT)
Dept: DERMATOLOGY | Facility: AMBULATORY SURGERY CENTER | Age: 83
End: 2018-10-17

## 2018-10-17 VITALS
HEART RATE: 72 BPM | BODY MASS INDEX: 22.2 KG/M2 | HEIGHT: 64 IN | SYSTOLIC BLOOD PRESSURE: 110 MMHG | WEIGHT: 130 LBS | OXYGEN SATURATION: 97 % | DIASTOLIC BLOOD PRESSURE: 80 MMHG | TEMPERATURE: 97.8 F

## 2018-10-17 DIAGNOSIS — Z85.828 HISTORY OF NONMELANOMA SKIN CANCER: ICD-10-CM

## 2018-10-17 DIAGNOSIS — D22.9 MULTIPLE BENIGN NEVI: ICD-10-CM

## 2018-10-17 DIAGNOSIS — L82.1 SEBORRHEIC KERATOSES: Primary | ICD-10-CM

## 2018-10-17 DIAGNOSIS — R58 ECCHYMOSIS OF FOREARM: ICD-10-CM

## 2018-10-17 DIAGNOSIS — L81.4 LENTIGINES: ICD-10-CM

## 2018-10-17 DIAGNOSIS — D18.01 CHERRY ANGIOMA: ICD-10-CM

## 2018-10-17 DIAGNOSIS — L85.3 DRY SKIN: ICD-10-CM

## 2018-10-17 RX ORDER — LATANOPROST 50 UG/ML
SOLUTION/ DROPS OPHTHALMIC
COMMUNITY
Start: 2018-03-30 | End: 2019-10-23

## 2018-10-17 RX ORDER — LOSARTAN POTASSIUM 25 MG/1
TABLET ORAL
COMMUNITY
Start: 2018-03-30 | End: 2019-04-17 | Stop reason: SDUPTHER

## 2018-10-17 RX ORDER — CLOPIDOGREL BISULFATE 75 MG/1
TABLET ORAL
COMMUNITY
Start: 2018-05-05 | End: 2019-03-08 | Stop reason: SDUPTHER

## 2018-10-17 RX ORDER — ATORVASTATIN CALCIUM 20 MG/1
TABLET, FILM COATED ORAL
COMMUNITY
Start: 2018-05-05 | End: 2019-03-07 | Stop reason: SDUPTHER

## 2018-10-17 NOTE — PROGRESS NOTES
Written by Lisandro Levi, as dictated by Kofi Walsh, [address]. Name: Jan Connolly       Age: 80 y.o. Date: 10/17/2018    Chief Complaint:   Chief Complaint   Patient presents with    Skin Exam       Subjective:    HPI  Ms. Jan Connolly is a 80 y.o. female who presents for a full skin exam.  The patient's last skin exam was on 17 and the patient does have current complaints related to her skin. She reports some rough lesions on her temples and neck. She is accompanied by her care worker Fiona Bel who began aiding her in May after her stroke. She states she is doing well after her stroke. She is feeling well and in her usual state of health today. She has no current illnesses, no other skin concerns. Her allergies, medications, medical, and social history are reviewed by me today. 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 Dermatological : negative    Social History     Socioeconomic History    Marital status:      Spouse name: Not on file    Number of children: Not on file    Years of education: Not on file    Highest education level: Not on file   Social Needs    Financial resource strain: Not on file    Food insecurity - worry: Not on file    Food insecurity - inability: Not on file    Transportation needs - medical: Not on file   Walkabout needs - non-medical: Not on file   Occupational History    Not on file   Tobacco Use    Smoking status: Former Smoker     Packs/day: 1.00     Years: 20.00     Pack years: 20.00     Types: Cigarettes     Last attempt to quit: 1971     Years since quittin.8    Smokeless tobacco: Never Used   Substance and Sexual Activity    Alcohol use:  Yes     Alcohol/week: 7.0 oz     Types: 10 Glasses of wine, 4 Standard drinks or equivalent per week    Drug use: No    Sexual activity: Not on file   Other Topics Concern    Not on file   Social History Narrative    ** Merged History Encounter **            Family History   Problem Relation Age of Onset    Stroke Mother     Heart Disease Father         heart attack in 66's       Past Medical History:   Diagnosis Date    Calculus of kidney     Cancer ([address]     bladder    Environmental allergies     History of actinic keratoses     Sun-damaged skin     Sunburn, blistering        Past Surgical History:   Procedure Laterality Date    HX APPENDECTOMY      HX BLADDER REPAIR      HX GYN      tahbso    HX HEENT      tonsillectomy    HX HYSTERECTOMY      HX MOHS PROCEDURES  12/28/2017    SCC L lower medial leg by Dr. Starr Lesser      partial right hip replacement        Current Outpatient Medications   Medication Sig Dispense Refill    cyanocobalamin (VITAMIN B-12) 1,000 mcg tablet Take 1,000 mcg by mouth daily.  aspirin delayed-release 81 mg tablet Take  by mouth daily.  losartan (COZAAR) 25 mg tablet Take  by mouth daily.  famotidine (PEPCID) 20 mg tablet Take 20 mg by mouth two (2) times a day.  levothyroxine (SYNTHROID) 25 mcg tablet Take  by mouth Daily (before breakfast).  clopidogrel (PLAVIX) 75 mg tab TAKE 1 TABLET BY MOUTH EVERY DAY. 30 Tab 5    atorvastatin (LIPITOR) 20 mg tablet TAKE ONE TABLET BY MOUTH EVERY NIGHT AT BEDTIME. 30 Tab 5    latanoprost (XALATAN) 0.005 % ophthalmic solution       loratadine-pseudoephedrine (CLARITIN-D 12 HOUR) 5-120 mg per tablet Take 1 Tab by mouth two (2) times daily as needed.       atorvastatin (LIPITOR) 20 mg tablet       clopidogrel (PLAVIX) 75 mg tab       latanoprost (XALATAN) 0.005 % ophthalmic solution       losartan (COZAAR) 25 mg tablet          Allergies   Allergen Reactions    Latex Rash    Lemon Unknown (comments)     Headache and close throat      Novocain [Procaine] Anaphylaxis     Caused her to faint         Objective:    Visit Vitals  BP 110/80 (BP 1 Location: Left arm, BP Patient Position: Sitting)   Pulse 72   Temp 97.8 °F (36.6 °C)   Ht 5' 4\" (1.626 m)   Wt 130 lb (59 kg)   SpO2 97%   BMI 22.31 kg/m²       Sam Pope is a 80 y.o. female who appears well and in no distress. She is awake, alert, and oriented. There is no preauricular, submandibular, or cervical lymphadenopathy. A skin examination was performed including her scalp, face (including eyelids), ears, neck, chest, back, abdomen, upper extremities (including digits/nails), lower extremities, breasts, buttocks; genital skin was not examined. She has a well-healed scar on her left lower medial leg without evidence of lesion recurrence. She has multiple other scars from her skin tears on the arms and legs. She has scattered waxy macules and keratotic papules consistent with seborrheic keratoses, including the lesion on her temples and neck, a few that are pink with inflammation. There are lentigines on sun exposed areas. She has inflammation and ecchymosis on the left upper arm (patient reports recent flu shot). . She has patches on dry skin on her arms and legs. She has pink intradermal nevi , no concerning features for severe atypia. She has scattered red papules consistent with cherry angiomas. She has ecchymosis on her forearms, legs. Assessment/Plan:    1. Personal history of nonmelnaoma skin cancer. I discussed sun protection, sunscreen use, the warning signs of skin cancer, the need for self-skin examinations, and the need for regular practitioner exams every 1 year. The patient should follow up sooner as needed if new, changing, or symptomatic skin lesions arise. 2. Seborrheic keratoses. The diagnosis was reviewed and the patient was reassured that no treatment is needed for these benign lesions. 3. Solar lentigos. The diagnosis and relationship to sun exposure was reviewed. Sun protection advised. 4. Vaccine reaction. The diagnosis was reviewed.  I advised to see her PCP if the area becomes worse or symptomatic. 5. Dry skin, arms and legs. The diagnosis was reviewed. I recommended the use of heavy moisturizers and creams for these areas. I gave her samples of La Roche Posay creams as well. 6. Normal nevi. The diagnosis of normal nevi was reviewed. I discussed sun protection, sunscreen use, the warning signs of skin cancer, the need for self-skin examinations, and the need for regular practitioner exams every 1 year. The patient should follow up sooner as needed if new, changing, or symptomatic skin lesions arise. 7. Cherry angiomas. The diagnosis was reviewed and the patient was reassured that no treatment is needed for these benign lesions. 8. Ecchymosis, forearms, legs. The diagnosis was discussed. The patient was reassured that no treatment is necessary at this time. I recommended DerMend to aid with discoloration and healing process. This plan was reviewed with the patient and patient agrees. All questions were answered. This scribe documentation was reviewed by me and accurately reflects the examination and decisions made by me.

## 2019-04-17 ENCOUNTER — OFFICE VISIT (OUTPATIENT)
Dept: DERMATOLOGY | Facility: AMBULATORY SURGERY CENTER | Age: 84
End: 2019-04-17

## 2019-04-17 VITALS
HEIGHT: 64 IN | HEART RATE: 66 BPM | DIASTOLIC BLOOD PRESSURE: 86 MMHG | OXYGEN SATURATION: 95 % | SYSTOLIC BLOOD PRESSURE: 124 MMHG | BODY MASS INDEX: 22.2 KG/M2 | WEIGHT: 130 LBS | RESPIRATION RATE: 14 BRPM

## 2019-04-17 DIAGNOSIS — L82.1 SEBORRHEIC KERATOSES: ICD-10-CM

## 2019-04-17 DIAGNOSIS — Z85.828 HISTORY OF NONMELANOMA SKIN CANCER: ICD-10-CM

## 2019-04-17 DIAGNOSIS — D18.01 CHERRY ANGIOMA: ICD-10-CM

## 2019-04-17 DIAGNOSIS — L57.0 ACTINIC KERATOSES: Primary | ICD-10-CM

## 2019-04-17 DIAGNOSIS — D22.9 MULTIPLE BENIGN NEVI: ICD-10-CM

## 2019-04-17 DIAGNOSIS — L81.4 LENTIGINES: ICD-10-CM

## 2019-04-17 RX ORDER — GLUCOSAMINE/CHONDR SU A SOD 750-600 MG
TABLET ORAL
COMMUNITY

## 2019-04-17 NOTE — PROGRESS NOTES
Written by Patsy Ryan, as dictated by Kofi Bo, [address]. Name: Arianna Camacho       Age: 80 y.o. Date: 2019    Chief Complaint:   Chief Complaint   Patient presents with    Skin Exam     full skin exam-no concerns       Subjective:    HPI  Ms. Arianna Camacho is a 80 y.o. female who presents for a full skin exam.  The patient's last skin exam was on 10/17/18 and the patient does have current complaints related to her skin. She reports a rough and scaly lesion on the nasal dorsum that has been present for a couple of months. She denies bleeding and tenderness. She also reports two raised and intermittently itchy lesions on the right forearm. She denies tenderness, however she does admit to picking these lesions. She is feeling well and in her usual state of health today. She has no current illnesses, no other skin concerns. Her allergies, medications, medical, and social history are reviewed by me today. The patient's pertinent skin history includes : personal history of NMSC and AKs  -SCC, left lower medial leg, Mohs, 17    ROS: Constitutional: Negative. Dermatological : positive for - skin lesion changes    Social History     Socioeconomic History    Marital status:      Spouse name: Not on file    Number of children: Not on file    Years of education: Not on file    Highest education level: Not on file   Occupational History    Not on file   Social Needs    Financial resource strain: Not on file    Food insecurity:     Worry: Not on file     Inability: Not on file    Transportation needs:     Medical: Not on file     Non-medical: Not on file   Tobacco Use    Smoking status: Former Smoker     Packs/day: 1.00     Years: 20.00     Pack years: 20.00     Types: Cigarettes     Last attempt to quit: 1971     Years since quittin.3    Smokeless tobacco: Never Used   Substance and Sexual Activity    Alcohol use:  Yes     Alcohol/week: 7.0 oz     Types: 10 Glasses of wine, 4 Standard drinks or equivalent per week    Drug use: No    Sexual activity: Not on file   Lifestyle    Physical activity:     Days per week: Not on file     Minutes per session: Not on file    Stress: Not on file   Relationships    Social connections:     Talks on phone: Not on file     Gets together: Not on file     Attends Spiritism service: Not on file     Active member of club or organization: Not on file     Attends meetings of clubs or organizations: Not on file     Relationship status: Not on file    Intimate partner violence:     Fear of current or ex partner: Not on file     Emotionally abused: Not on file     Physically abused: Not on file     Forced sexual activity: Not on file   Other Topics Concern    Not on file   Social History Narrative    ** Merged History Encounter **            Family History   Problem Relation Age of Onset    Stroke Mother     Heart Disease Father         heart attack in 66's       Past Medical History:   Diagnosis Date    Calculus of kidney     Cancer ([facility] [address]     bladder    Environmental allergies     History of actinic keratoses     Sun-damaged skin     Sunburn, blistering        Past Surgical History:   Procedure Laterality Date    HX APPENDECTOMY      HX BLADDER REPAIR      HX GYN      tahbso    HX HEENT      tonsillectomy    HX HYSTERECTOMY      HX MOHS PROCEDURES  12/28/2017    SCC L lower medial leg by Dr. Naty Tirado      partial right hip replacement        Current Outpatient Medications   Medication Sig Dispense Refill    Biotin 2,500 mcg cap Take  by mouth.  clopidogrel (PLAVIX) 75 mg tab TAKE 1 TABLET BY MOUTH EVERY DAY. 30 Tab 5    atorvastatin (LIPITOR) 20 mg tablet TAKE ONE TABLET BY MOUTH EVERY NIGHT AT BEDTIME. 30 Tab 5    levothyroxine (SYNTHROID) 25 mcg tablet TAKE 1 TABLET BY MOUTH ONCE DAILY.  30 Tab 5    latanoprost (XALATAN) 0.005 % ophthalmic solution       cyanocobalamin (VITAMIN B-12) 1,000 mcg tablet Take 1,000 mcg by mouth daily.  famotidine (PEPCID) 20 mg tablet Take 20 mg by mouth two (2) times a day.  latanoprost (XALATAN) 0.005 % ophthalmic solution       loratadine-pseudoephedrine (CLARITIN-D 12 HOUR) 5-120 mg per tablet Take 1 Tab by mouth two (2) times daily as needed.  aspirin delayed-release 81 mg tablet Take  by mouth daily.  losartan (COZAAR) 25 mg tablet Take  by mouth daily. Allergies   Allergen Reactions    Latex Rash    Lemon Unknown (comments)     Headache and close throat      Novocain [Procaine] Anaphylaxis     Caused her to faint         Objective:    Visit Vitals  /86 (BP 1 Location: Right arm, BP Patient Position: Sitting)   Pulse 66   Resp 14   Ht 5' 4\" (1.626 m)   Wt 130 lb (59 kg)   SpO2 95%   BMI 22.31 kg/m²       Yamileth Lao is a 80 y.o. female who appears well and in no distress. She is awake, alert, and oriented. There is no preauricular, submandibular, or cervical lymphadenopathy. A skin examination was performed including her scalp, face (including eyelids), ears, neck, chest, back, abdomen, upper extremities (including digits/nails), lower extremities, breasts, buttocks; genital skin was not examined. There is a well-healed scar on the left lower medial leg without evidence of lesion recurrence. There are thick and thin scaled actinic keratoses on the nasal dorsum, nasal tip x2, and upper lip x2. There are two thick-scaled actinic keratoses on the right forearm. She has scattered waxy macules and keratotic papules consistent with seborrheic keratoses. There are lentigines on sun exposed areas. She has pink intradermal nevi and brown junctional nevi, no concerning features for severe atypia. She has scattered red papules consistent with cherry angiomas. Various ecchymoses are noted on the forearms and legs. Assessment/Plan:    1. Personal history of nonmelanoma skin cancer.   I discussed sun protection, sunscreen use, the warning signs of skin cancer, the need for self-skin examinations, and the need for regular practitioner exams every 6 months. The patient should follow up sooner as needed if new, changing, or symptomatic skin lesions arise. 2. Actinic Keratoses. The diagnosis of this precancerous lesion related to sun exposure was reviewed. Verbal consent was obtained. I treated 7 lesions with cryotherapy and post-cryotherapy care was reviewed. Pt advised to call if lesions do not heal.     3. Seborrheic keratoses. The diagnosis was reviewed and the patient was reassured that no treatment is needed for these benign lesions. 4. Solar lentigos. The diagnosis and relationship to sun exposure was reviewed. Sun protection advised. 5. Normal nevi. The diagnosis of normal nevi was reviewed. I discussed sun protection, sunscreen use, the warning signs of skin cancer, the need for self-skin examinations, and the need for regular practitioner exams every 6 months. The patient should follow up sooner as needed if new, changing, or symptomatic skin lesions arise. 6. Cherry angiomas. The diagnosis was reviewed and the patient was reassured that no treatment is needed for these benign lesions. This plan was reviewed with the patient and patient agrees. All questions were answered. This scribe documentation was reviewed by me and accurately reflects the examination and decisions made by me. [facility]   OFFICE PROCEDURE PROGRESS NOTE   Chart reviewed for the following:   Kofi CUMMINS, have reviewed the History, Physical and updated the Allergic reactions for Deborra Clam. TIME OUT performed immediately prior to start of procedure:   Tomeka CUMMINS, have performed the following reviews on Deborra Clam   prior to the start of the procedure:     * Patient was identified by name and date of birth   * Agreement on procedure being performed was verified   * Risks and Benefits explained to the patient   * Procedure site verified and marked as necessary   * Patient was positioned for comfort   * Consent was signed and verified     Time: 12:00 PM  Date of procedure: 4/17/2019  Procedure performed by: Fawn Esteves.  Shivam Hayes DNP  Provider assisted by: self   Patient assisted by: self   How tolerated by patient: tolerated the procedure well with no complications   Comments: none

## 2019-05-10 ENCOUNTER — TELEPHONE (OUTPATIENT)
Dept: DERMATOLOGY | Facility: AMBULATORY SURGERY CENTER | Age: 84
End: 2019-05-10

## 2019-05-10 NOTE — TELEPHONE ENCOUNTER
Patient called with c/o skin tear overnight w/ fall. Was seen at ER but needs bandage changed on Monday which we have done for her before. She will come in Monday for evaluation and bandage change.

## 2019-05-13 ENCOUNTER — OFFICE VISIT (OUTPATIENT)
Dept: DERMATOLOGY | Facility: AMBULATORY SURGERY CENTER | Age: 84
End: 2019-05-13

## 2019-05-13 VITALS
OXYGEN SATURATION: 100 % | WEIGHT: 130 LBS | BODY MASS INDEX: 22.2 KG/M2 | HEART RATE: 65 BPM | DIASTOLIC BLOOD PRESSURE: 78 MMHG | HEIGHT: 64 IN | SYSTOLIC BLOOD PRESSURE: 124 MMHG | TEMPERATURE: 97.6 F

## 2019-05-13 DIAGNOSIS — S51.811A SKIN TEAR OF RIGHT FOREARM WITHOUT COMPLICATION, INITIAL ENCOUNTER: Primary | ICD-10-CM

## 2019-05-13 DIAGNOSIS — R58 ECCHYMOSIS: ICD-10-CM

## 2019-05-13 DIAGNOSIS — Z85.828 HISTORY OF NONMELANOMA SKIN CANCER: ICD-10-CM

## 2019-05-13 RX ORDER — CEPHALEXIN 500 MG/1
500 CAPSULE ORAL
COMMUNITY
Start: 2019-05-11 | End: 2019-05-21 | Stop reason: SDUPTHER

## 2019-05-13 NOTE — PROGRESS NOTES
Name: Dinora Barker       Age: 80 y.o. Date: 2019    Chief Complaint:   Chief Complaint   Patient presents with    Skin Exam     skin tear       Subjective:    HPI:  Ms.. Dinora Barker is a 80 y.o. female who presents for the bandage change and evaluation of a skin tear on the right arm. She states that the lesion appeared when she fell last week striking the arm on a door knob. She was seen at [facility] ER twice for bandaging. She called Friday requesting my opinion on the wound and for us to do bandage changes as we have done before with her skin tears. She mentions she has been taking antibiotics. ROS: Consitutional: Negative  Dermatological : positive for - skin lesion changes    Social History     Socioeconomic History    Marital status:      Spouse name: Not on file    Number of children: Not on file    Years of education: Not on file    Highest education level: Not on file   Occupational History    Not on file   Social Needs    Financial resource strain: Not on file    Food insecurity:     Worry: Not on file     Inability: Not on file    Transportation needs:     Medical: Not on file     Non-medical: Not on file   Tobacco Use    Smoking status: Former Smoker     Packs/day: 1.00     Years: 20.00     Pack years: 20.00     Types: Cigarettes     Last attempt to quit: 1971     Years since quittin.4    Smokeless tobacco: Never Used   Substance and Sexual Activity    Alcohol use:  Yes     Alcohol/week: 7.0 oz     Types: 10 Glasses of wine, 4 Standard drinks or equivalent per week    Drug use: No    Sexual activity: Not on file   Lifestyle    Physical activity:     Days per week: Not on file     Minutes per session: Not on file    Stress: Not on file   Relationships    Social connections:     Talks on phone: Not on file     Gets together: Not on file     Attends Episcopalian service: Not on file     Active member of club or organization: Not on file Attends meetings of clubs or organizations: Not on file     Relationship status: Not on file    Intimate partner violence:     Fear of current or ex partner: Not on file     Emotionally abused: Not on file     Physically abused: Not on file     Forced sexual activity: Not on file   Other Topics Concern    Not on file   Social History Narrative    ** Merged History Encounter **            Family History   Problem Relation Age of Onset    Stroke Mother     Heart Disease Father         heart attack in 66's       Past Medical History:   Diagnosis Date    Calculus of kidney     Cancer ([facility] [address]     bladder    Environmental allergies     History of actinic keratoses     Sun-damaged skin     Sunburn, blistering        Past Surgical History:   Procedure Laterality Date    HX APPENDECTOMY      HX BLADDER REPAIR      HX GYN      tahbso    HX HEENT      tonsillectomy    HX HYSTERECTOMY      HX MOHS PROCEDURES  12/28/2017    SCC L lower medial leg by Dr. Nguyen Damon      partial right hip replacement        Current Outpatient Medications   Medication Sig Dispense Refill    Biotin 2,500 mcg cap Take  by mouth.  clopidogrel (PLAVIX) 75 mg tab TAKE 1 TABLET BY MOUTH EVERY DAY. 30 Tab 5    atorvastatin (LIPITOR) 20 mg tablet TAKE ONE TABLET BY MOUTH EVERY NIGHT AT BEDTIME. 30 Tab 5    levothyroxine (SYNTHROID) 25 mcg tablet TAKE 1 TABLET BY MOUTH ONCE DAILY. 30 Tab 5    losartan (COZAAR) 25 mg tablet Take  by mouth daily.  famotidine (PEPCID) 20 mg tablet Take 20 mg by mouth two (2) times a day.  latanoprost (XALATAN) 0.005 % ophthalmic solution       cephALEXin (KEFLEX) 500 mg capsule Take 500 mg by mouth Before breakfast, lunch, and dinner.  latanoprost (XALATAN) 0.005 % ophthalmic solution       cyanocobalamin (VITAMIN B-12) 1,000 mcg tablet Take 1,000 mcg by mouth daily.  aspirin delayed-release 81 mg tablet Take  by mouth daily.       loratadine-pseudoephedrine (CLARITIN-D 12 HOUR) 5-120 mg per tablet Take 1 Tab by mouth two (2) times daily as needed. Allergies   Allergen Reactions    Latex Rash    Lemon Unknown (comments)     Headache and close throat      Novocain [Procaine] Anaphylaxis     Caused her to faint         Objective:    Visit Vitals  /78 (BP 1 Location: Left arm, BP Patient Position: Sitting)   Pulse 65   Temp 97.6 °F (36.4 °C) (Oral)   Ht 5' 4\" (1.626 m)   Wt 130 lb (59 kg)   SpO2 100%   BMI 22.31 kg/m²       Edmar Purvis is a 80 y.o. female who appears well and in no distress. She is awake, alert, and oriented. There is no preauricular, submandibular, or cervical lymphadenopathy. A limited skin examination was completed including right arm. Skin tear with ecchymosis noted. No redness or sign of infection. Steri strips present xeroform removed showing a stellate skin tear with close approximation but approximately 1cm of granulating tissue. Assessment/Plan:    1. Personal history of nonmelanoma skin cancer. I discussed sun protection, sunscreen use, the warning signs of skin cancer, the need for self-skin examinations, and the need for regular practitioner exams every 6 months. The patient should follow up sooner as needed if new, changing, or symptomatic skin lesions arise. 2. Skin tear, right arm. . Bandage was changed in the office today. Pt was encouraged to keep wound dry. F/u in 1 week for another bandage change. Written by Leobardo Meier, as dictated by Helen Minaya, [address]. This plan was reviewed with the patient and patient agrees. All questions were answered. This scribe documentation was reviewed by me and accurately reflects the examination and decisions made by me.

## 2019-05-21 ENCOUNTER — OFFICE VISIT (OUTPATIENT)
Dept: DERMATOLOGY | Facility: AMBULATORY SURGERY CENTER | Age: 84
End: 2019-05-21

## 2019-05-21 VITALS
WEIGHT: 130 LBS | RESPIRATION RATE: 16 BRPM | OXYGEN SATURATION: 99 % | TEMPERATURE: 97.6 F | HEART RATE: 64 BPM | DIASTOLIC BLOOD PRESSURE: 74 MMHG | BODY MASS INDEX: 22.2 KG/M2 | SYSTOLIC BLOOD PRESSURE: 150 MMHG | HEIGHT: 64 IN

## 2019-05-21 DIAGNOSIS — Z48.00 ENCOUNTER FOR CHANGE OR REMOVAL OF NONSURGICAL WOUND DRESSING: Primary | ICD-10-CM

## 2019-05-21 RX ORDER — CEPHALEXIN 500 MG/1
500 CAPSULE ORAL 3 TIMES DAILY
Qty: 21 CAP | Refills: 0 | Status: SHIPPED | OUTPATIENT
Start: 2019-05-21 | End: 2019-09-18 | Stop reason: SDUPTHER

## 2019-05-21 NOTE — PATIENT INSTRUCTIONS
Chief Complaint   Patient presents with    Skin Exam     Bandage change - Right forearm     Self Skin Exam and Sunscreens    Early detection and treatment is essential in the treatment of all forms of skin cancer. If caught early, all forms of skin cancer are curable. In addition to your regular visits, you should perform a monthly skin examination. Over time, you become familiar with what is normally found on your skin and can identify new or suspicious spots. One of the screening tools you can use to assess your skin is to follow the ABCDEs:    A= Asymmetry (One half is unlike the other half)     B= Border (An irregular, scalloped or poorly defined edge)    C= Color (Is varied from one area to another, has shades of tan, brown/ black, white, red or blue)    D= Diameter (Spots larger than 6mm or a pencil eraser)    E= Evolving (New spots or one that is changing in size, shape, or color)    A follow- up interval will be customized based on your history of skin cancer or level of skin damage and risk factors. In any case, if you notice a suspicious or new spot, an appointment should be arranged between regular visits. Everyone should use sunscreen and sun-safe practices, which is especially important for those with a personal or family history of skin cancer. Suggestions for this include:    1. Use daily moisturizers containing SPF 30 or higher. 2. Wear long sleeve clothing with UPF ratings and a broad-brimmed hat. 3. Apply sunscreen with SPF 30 or higher to all sun exposed areas if you are going to be in the sun. A broad spectrum UVA/ UVB sunscreen is best.  Dont forget to REAPPLY every two hours or more often if swimming or sweating! 4. Avoid outside activities during peak sun hours, especially in the summer (10am- 2pm). 5. DO NOT use tanning beds.     Using sunscreen and sun-safe practices can help reduce the likelihood of developing skin cancer or additional skin cancers in those previously diagnosed.

## 2019-05-21 NOTE — PROGRESS NOTES
RN removed bandage on right forearm wound, then cleaned area with Hibiclens. Vaseline, xeroform, telfa, gauze, and coban were then applied over site. Patient tolerated bandage change well.

## 2019-05-21 NOTE — PROGRESS NOTES
Written by Luis Miguel Ge, as dictated by Diego Woo, [address]. Name: Emelina Ko       Age: 80 y.o. Date: 2019    Chief Complaint:   Chief Complaint   Patient presents with    Skin Exam     Bandage change - Right forearm       Subjective:    HPI:  Emelina Ko presents for wound check of a skin tear that we have been managing since 19. The wound was bandaged at her last visit. She denies pain. She states she has finished the antibiotics she was prescribed. She states pain resolved recently. ROS: Consitutional: Negative  Dermatological : positive for - healing skin tear    Social History     Socioeconomic History    Marital status:      Spouse name: Not on file    Number of children: Not on file    Years of education: Not on file    Highest education level: Not on file   Occupational History    Not on file   Social Needs    Financial resource strain: Not on file    Food insecurity:     Worry: Not on file     Inability: Not on file    Transportation needs:     Medical: Not on file     Non-medical: Not on file   Tobacco Use    Smoking status: Former Smoker     Packs/day: 1.00     Years: 20.00     Pack years: 20.00     Types: Cigarettes     Last attempt to quit: 1971     Years since quittin.4    Smokeless tobacco: Never Used   Substance and Sexual Activity    Alcohol use:  Yes     Alcohol/week: 7.0 oz     Types: 10 Glasses of wine, 4 Standard drinks or equivalent per week    Drug use: No    Sexual activity: Not on file   Lifestyle    Physical activity:     Days per week: Not on file     Minutes per session: Not on file    Stress: Not on file   Relationships    Social connections:     Talks on phone: Not on file     Gets together: Not on file     Attends Taoist service: Not on file     Active member of club or organization: Not on file     Attends meetings of clubs or organizations: Not on file     Relationship status: Not on file    Intimate partner violence:     Fear of current or ex partner: Not on file     Emotionally abused: Not on file     Physically abused: Not on file     Forced sexual activity: Not on file   Other Topics Concern    Not on file   Social History Narrative    ** Merged History Encounter **            Family History   Problem Relation Age of Onset    Stroke Mother     Heart Disease Father         heart attack in 66's       Past Medical History:   Diagnosis Date    Calculus of kidney     Cancer ([address]     bladder    Environmental allergies     History of actinic keratoses     Sun-damaged skin     Sunburn, blistering        Past Surgical History:   Procedure Laterality Date    HX APPENDECTOMY      HX BLADDER REPAIR      HX GYN      tahbso    HX HEENT      tonsillectomy    HX HYSTERECTOMY      HX MOHS PROCEDURES  12/28/2017    SCC L lower medial leg by Dr. Cosme Suazo      partial right hip replacement        Current Outpatient Medications   Medication Sig Dispense Refill    cephALEXin (KEFLEX) 500 mg capsule Take 1 Cap by mouth three (3) times daily. 21 Cap 0    Biotin 2,500 mcg cap Take  by mouth.  clopidogrel (PLAVIX) 75 mg tab TAKE 1 TABLET BY MOUTH EVERY DAY. 30 Tab 5    atorvastatin (LIPITOR) 20 mg tablet TAKE ONE TABLET BY MOUTH EVERY NIGHT AT BEDTIME. 30 Tab 5    levothyroxine (SYNTHROID) 25 mcg tablet TAKE 1 TABLET BY MOUTH ONCE DAILY. 30 Tab 5    latanoprost (XALATAN) 0.005 % ophthalmic solution       cyanocobalamin (VITAMIN B-12) 1,000 mcg tablet Take 1,000 mcg by mouth daily.  aspirin delayed-release 81 mg tablet Take  by mouth daily.  losartan (COZAAR) 25 mg tablet Take  by mouth daily.  famotidine (PEPCID) 20 mg tablet Take 20 mg by mouth two (2) times a day.  latanoprost (XALATAN) 0.005 % ophthalmic solution       loratadine-pseudoephedrine (CLARITIN-D 12 HOUR) 5-120 mg per tablet Take 1 Tab by mouth two (2) times daily as needed. Allergies   Allergen Reactions    Latex Rash    Lemon Unknown (comments)     Headache and close throat      Novocain [Procaine] Anaphylaxis     Caused her to faint         Objective:    Visit Vitals  /74 (BP 1 Location: Left arm, BP Patient Position: Sitting)   Pulse 64   Temp 97.6 °F (36.4 °C)   Resp 16   Ht 5' 4\" (1.626 m)   Wt 130 lb (59 kg)   SpO2 99%   BMI 22.31 kg/m²       Stephanie Boothe is a 80 y.o. female who appears well and in no distress. She is awake, alert, and oriented. A limited skin examination was completed including the right forearm. The site was examined. There is evidence of infection --there is serosanguineous drainage at the site with odor but no skin redness. There is not erythema or tenderness. There is edema on the lateral aspect of the wound. The medial aspect healing well. Assessment/Plan:    1. Skin tear, right forearm. The surgical site is healing well. Due to some evidence of infection, pt was prescribed Keflex 500 mg to take TID. Wound was rebandaged with Vaseline, Xeroform, Telfa, gauze, and Coban. Steristrips were not removed. Additional care was reviewed. Follow up will be in 1 week. This plan was reviewed with the patient and patient agrees. All questions were answered. This scribe documentation was reviewed by me and accurately reflects the examination and decisions made by me.

## 2019-05-28 ENCOUNTER — OFFICE VISIT (OUTPATIENT)
Dept: DERMATOLOGY | Facility: AMBULATORY SURGERY CENTER | Age: 84
End: 2019-05-28

## 2019-05-28 VITALS
BODY MASS INDEX: 22.31 KG/M2 | HEIGHT: 64 IN | RESPIRATION RATE: 14 BRPM | SYSTOLIC BLOOD PRESSURE: 120 MMHG | OXYGEN SATURATION: 98 % | TEMPERATURE: 97.8 F | DIASTOLIC BLOOD PRESSURE: 78 MMHG | HEART RATE: 60 BPM

## 2019-05-28 DIAGNOSIS — Z48.00 ENCOUNTER FOR CHANGE OR REMOVAL OF NONSURGICAL WOUND DRESSING: Primary | ICD-10-CM

## 2019-05-28 NOTE — PROGRESS NOTES
Written by Autumn Michaels, as dictated by Maria Guadalupe Simpson, [address]. Name: Dustin Barry       Age: 80 y.o. Date: 2019    Chief Complaint:   Chief Complaint   Patient presents with    Wound Check     skin tear on right arm. Subjective:    HPI:  Dustin Barry presents for wound check of a skin tear that we have been managing since 19. The wound was bandaged at her last visit. She denies pain. She states she has taken the Keflex. She reports scaly and raised lesions on the chest that she would like evaluated as well. ROS: Consitutional: Negative  Dermatological : positive for - healing skin tear    Social History     Socioeconomic History    Marital status:      Spouse name: Not on file    Number of children: Not on file    Years of education: Not on file    Highest education level: Not on file   Occupational History    Not on file   Social Needs    Financial resource strain: Not on file    Food insecurity:     Worry: Not on file     Inability: Not on file    Transportation needs:     Medical: Not on file     Non-medical: Not on file   Tobacco Use    Smoking status: Former Smoker     Packs/day: 1.00     Years: 20.00     Pack years: 20.00     Types: Cigarettes     Last attempt to quit: 1971     Years since quittin.4    Smokeless tobacco: Never Used   Substance and Sexual Activity    Alcohol use:  Yes     Alcohol/week: 7.0 oz     Types: 10 Glasses of wine, 4 Standard drinks or equivalent per week    Drug use: No    Sexual activity: Not on file   Lifestyle    Physical activity:     Days per week: Not on file     Minutes per session: Not on file    Stress: Not on file   Relationships    Social connections:     Talks on phone: Not on file     Gets together: Not on file     Attends Baptism service: Not on file     Active member of club or organization: Not on file     Attends meetings of clubs or organizations: Not on file Relationship status: Not on file    Intimate partner violence:     Fear of current or ex partner: Not on file     Emotionally abused: Not on file     Physically abused: Not on file     Forced sexual activity: Not on file   Other Topics Concern    Not on file   Social History Narrative    ** Merged History Encounter **            Family History   Problem Relation Age of Onset    Stroke Mother     Heart Disease Father         heart attack in 66's       Past Medical History:   Diagnosis Date    Calculus of kidney     Cancer ([facility] [address]     bladder    Environmental allergies     History of actinic keratoses     Sun-damaged skin     Sunburn, blistering        Past Surgical History:   Procedure Laterality Date    HX APPENDECTOMY      HX BLADDER REPAIR      HX GYN      tahbso    HX HEENT      tonsillectomy    HX HYSTERECTOMY      HX MOHS PROCEDURES  12/28/2017    SCC L lower medial leg by Dr. Robert Gomez      partial right hip replacement        Current Outpatient Medications   Medication Sig Dispense Refill    cephALEXin (KEFLEX) 500 mg capsule Take 1 Cap by mouth three (3) times daily. 21 Cap 0    Biotin 2,500 mcg cap Take  by mouth.  clopidogrel (PLAVIX) 75 mg tab TAKE 1 TABLET BY MOUTH EVERY DAY. 30 Tab 5    atorvastatin (LIPITOR) 20 mg tablet TAKE ONE TABLET BY MOUTH EVERY NIGHT AT BEDTIME. 30 Tab 5    levothyroxine (SYNTHROID) 25 mcg tablet TAKE 1 TABLET BY MOUTH ONCE DAILY. 30 Tab 5    latanoprost (XALATAN) 0.005 % ophthalmic solution       cyanocobalamin (VITAMIN B-12) 1,000 mcg tablet Take 1,000 mcg by mouth daily.  losartan (COZAAR) 25 mg tablet Take  by mouth daily.  famotidine (PEPCID) 20 mg tablet Take 20 mg by mouth two (2) times a day.  latanoprost (XALATAN) 0.005 % ophthalmic solution       aspirin delayed-release 81 mg tablet Take  by mouth daily.       loratadine-pseudoephedrine (CLARITIN-D 12 HOUR) 5-120 mg per tablet Take 1 Tab by mouth two (2) times daily as needed. Allergies   Allergen Reactions    Latex Rash    Lemon Unknown (comments)     Headache and close throat      Novocain [Procaine] Anaphylaxis     Caused her to faint         Objective:    Visit Vitals  /78 (BP 1 Location: Left arm, BP Patient Position: Sitting)   Pulse 60   Temp 97.8 °F (36.6 °C) (Oral)   Resp 14   Ht 5' 4\" (1.626 m)   SpO2 98%   BMI 22.31 kg/m²       Mann Ray is a 80 y.o. female who appears well and in no distress. She is awake, alert, and oriented. There is no preauricular, submandibular, or cervical lymphadenopathy. A limited skin examination was completed including the right forearm and chest.    The site was examined. There is minimal evidence of infection- there is a mild amount of serosanguineous drainage at the site with odor but no skin redness; this has decreased since her last visit. There is not erythema or tenderness. There is edema on the lateral aspect of the wound. The medial aspect is healing well. She has scattered waxy macules and keratotic papules consistent with seborrheic keratoses, including the lesions of concern on the chest.      Assessment/Plan:     1. Skin tear, right forearm. The surgical site is healing well. Wound was rebandaged with Vaseline, Xeroform, Telfa, gauze, and Coban. Steristrips were not removed. I advised to remove bandage in 3 days and expose the lesion to air for a few hours. Patient was given supplies to re-bandage the wound. Additional care was reviewed. Follow up will be in 1 week. 2. Seborrheic keratoses. The diagnosis was reviewed and the patient was reassured that no treatment is needed for these benign lesions. This plan was reviewed with the patient and patient agrees. All questions were answered. This scribe documentation was reviewed by me and accurately reflects the examination and decisions made by me.

## 2019-06-04 ENCOUNTER — OFFICE VISIT (OUTPATIENT)
Dept: DERMATOLOGY | Facility: AMBULATORY SURGERY CENTER | Age: 84
End: 2019-06-04

## 2019-06-04 VITALS
BODY MASS INDEX: 22.2 KG/M2 | SYSTOLIC BLOOD PRESSURE: 138 MMHG | DIASTOLIC BLOOD PRESSURE: 72 MMHG | WEIGHT: 130 LBS | RESPIRATION RATE: 16 BRPM | OXYGEN SATURATION: 98 % | HEART RATE: 66 BPM | HEIGHT: 64 IN | TEMPERATURE: 97.6 F

## 2019-06-04 DIAGNOSIS — S51.011D SKIN TEAR OF ELBOW WITHOUT COMPLICATION, RIGHT, SUBSEQUENT ENCOUNTER: ICD-10-CM

## 2019-06-04 DIAGNOSIS — Z48.00 ENCOUNTER FOR CHANGE OR REMOVAL OF NONSURGICAL WOUND DRESSING: Primary | ICD-10-CM

## 2019-06-04 DIAGNOSIS — L82.1 SEBORRHEIC KERATOSIS: ICD-10-CM

## 2019-06-04 NOTE — PROGRESS NOTES
Written by Dick Hinson, as dictated by Renu Sidhu, [address]. Name: Laverne Vu       Age: 80 y.o. Date: 2019    Chief Complaint:   Chief Complaint   Patient presents with    Wound Check     right forearm    Skin Exam     right side of face in front of ear       Subjective:    HPI:  Ms.. Laverne Vu is a 80 y.o. female who presents for wound check of a skin tear that we have been managing since 19. The wound was bandaged at her last visit. She denies pain. She states she has given the wound air time and it is healing very well. She reports a lesion on the right temple that she would also like evaluated. ROS: Consitutional: Negative  Dermatological : negative    Social History     Socioeconomic History    Marital status:      Spouse name: Not on file    Number of children: Not on file    Years of education: Not on file    Highest education level: Not on file   Occupational History    Not on file   Social Needs    Financial resource strain: Not on file    Food insecurity:     Worry: Not on file     Inability: Not on file    Transportation needs:     Medical: Not on file     Non-medical: Not on file   Tobacco Use    Smoking status: Former Smoker     Packs/day: 1.00     Years: 20.00     Pack years: 20.00     Types: Cigarettes     Last attempt to quit: 1971     Years since quittin.4    Smokeless tobacco: Never Used   Substance and Sexual Activity    Alcohol use:  Yes     Alcohol/week: 7.0 oz     Types: 10 Glasses of wine, 4 Standard drinks or equivalent per week    Drug use: No    Sexual activity: Not on file   Lifestyle    Physical activity:     Days per week: Not on file     Minutes per session: Not on file    Stress: Not on file   Relationships    Social connections:     Talks on phone: Not on file     Gets together: Not on file     Attends Faith service: Not on file     Active member of club or organization: Not on file Attends meetings of clubs or organizations: Not on file     Relationship status: Not on file    Intimate partner violence:     Fear of current or ex partner: Not on file     Emotionally abused: Not on file     Physically abused: Not on file     Forced sexual activity: Not on file   Other Topics Concern    Not on file   Social History Narrative    ** Merged History Encounter **            Family History   Problem Relation Age of Onset    Stroke Mother     Heart Disease Father         heart attack in 66's       Past Medical History:   Diagnosis Date    Calculus of kidney     Cancer ([facility] [address]     bladder    Environmental allergies     History of actinic keratoses     Sun-damaged skin     Sunburn, blistering        Past Surgical History:   Procedure Laterality Date    HX APPENDECTOMY      HX BLADDER REPAIR      HX GYN      tahbso    HX HEENT      tonsillectomy    HX HYSTERECTOMY      HX MOHS PROCEDURES  12/28/2017    SCC L lower medial leg by Dr. Richard Alvares      partial right hip replacement        Current Outpatient Medications   Medication Sig Dispense Refill    cephALEXin (KEFLEX) 500 mg capsule Take 1 Cap by mouth three (3) times daily. 21 Cap 0    Biotin 2,500 mcg cap Take  by mouth.  clopidogrel (PLAVIX) 75 mg tab TAKE 1 TABLET BY MOUTH EVERY DAY. 30 Tab 5    atorvastatin (LIPITOR) 20 mg tablet TAKE ONE TABLET BY MOUTH EVERY NIGHT AT BEDTIME. 30 Tab 5    levothyroxine (SYNTHROID) 25 mcg tablet TAKE 1 TABLET BY MOUTH ONCE DAILY. 30 Tab 5    latanoprost (XALATAN) 0.005 % ophthalmic solution       cyanocobalamin (VITAMIN B-12) 1,000 mcg tablet Take 1,000 mcg by mouth daily.  aspirin delayed-release 81 mg tablet Take  by mouth daily.  losartan (COZAAR) 25 mg tablet Take  by mouth daily.  famotidine (PEPCID) 20 mg tablet Take 20 mg by mouth two (2) times a day.       latanoprost (XALATAN) 0.005 % ophthalmic solution       loratadine-pseudoephedrine (CLARITIN-D 12 HOUR) 5-120 mg per tablet Take 1 Tab by mouth two (2) times daily as needed. Allergies   Allergen Reactions    Latex Rash    Lemon Unknown (comments)     Headache and close throat      Novocain [Procaine] Anaphylaxis     Caused her to faint         Objective:    Visit Vitals  /72 (BP 1 Location: Left arm, BP Patient Position: Sitting)   Pulse 66   Temp 97.6 °F (36.4 °C) (Oral)   Resp 16   Ht 5' 4\" (1.626 m)   Wt 130 lb (59 kg)   SpO2 98%   BMI 22.31 kg/m²       Bo Rock is a 80 y.o. female who appears well and in no distress. She is awake, alert, and oriented. A limited skin examination was completed including the right forearm and right temple. The skin tear site on the right forearm is healing well. At the medial aspect there is healthy granulation tissue. The lesion of concern on the right temple is a seborrheic keratosis. Assessment/Plan:    1. Skin tear, right forearm.  The surgical site is healing well. Wound was rebandaged with Vaseline, Xeroform, Telfa, gauze, and Coban. I advised to change the bandage every 3 days and expose the lesion to air for a few hours. She should continue this until it is healed. Patient was given supplies to re-bandage the wound. Additional care was reviewed. She will follow up prn.    2. Seborrheic keratoses. The diagnosis was reviewed and the patient was reassured that no treatment is needed for these benign lesions. This plan was reviewed with the patient and patient agrees. All questions were answered. This scribe documentation was reviewed by me and accurately reflects the examination and decisions made by me.

## 2019-06-04 NOTE — PROGRESS NOTES
Identified pt with two pt identifiers(name and ). Reviewed record in preparation for visit and have obtained necessary documentation. All patient medications has been reviewed. Chief Complaint   Patient presents with    Wound Check     right forearm    Skin Exam     right side of face in front of ear       Health Maintenance Due   Topic    COLONOSCOPY     Shingrix Vaccine Age 50> (1 of 2)    GLAUCOMA SCREENING Q2Y     Pneumococcal 65+ years (1 of 2 - PCV13)    DTaP/Tdap/Td series (1 - Tdap)       Vitals:    19 0934   BP: 138/72   Pulse: 66   Resp: 16   Temp: 97.6 °F (36.4 °C)   TempSrc: Oral   SpO2: 98%   Weight: 59 kg (130 lb)   Height: 5' 4\" (1.626 m)   PainSc:   0 - No pain       Coordination of Care Questionnaire:   1) Have you been to an emergency room, urgent care, or hospitalized since your last visit?   no       2. Have seen or consulted any other health care provider since your last visit? NO    3) Do you have an Advanced Directive/ Living Will in place? YES, informed to bring into office. If yes, do we have a copy on file NO  If no, would you like information NO    Patient is accompanied by adult caretaker I have received verbal consent from Shanice Ndiaye to discuss any/all medical information while they are present in the room.

## 2019-10-23 ENCOUNTER — OFFICE VISIT (OUTPATIENT)
Dept: DERMATOLOGY | Facility: AMBULATORY SURGERY CENTER | Age: 84
End: 2019-10-23

## 2019-10-23 VITALS
DIASTOLIC BLOOD PRESSURE: 70 MMHG | HEART RATE: 69 BPM | HEIGHT: 64 IN | WEIGHT: 130 LBS | OXYGEN SATURATION: 97 % | SYSTOLIC BLOOD PRESSURE: 128 MMHG | RESPIRATION RATE: 16 BRPM | TEMPERATURE: 97.3 F | BODY MASS INDEX: 22.2 KG/M2

## 2019-10-23 DIAGNOSIS — L82.1 SEBORRHEIC KERATOSES: ICD-10-CM

## 2019-10-23 DIAGNOSIS — D18.01 CHERRY ANGIOMA: ICD-10-CM

## 2019-10-23 DIAGNOSIS — L57.0 ACTINIC KERATOSES: Primary | ICD-10-CM

## 2019-10-23 DIAGNOSIS — D22.9 MULTIPLE BENIGN NEVI: ICD-10-CM

## 2019-10-23 DIAGNOSIS — Z85.828 HISTORY OF NONMELANOMA SKIN CANCER: ICD-10-CM

## 2019-10-23 RX ORDER — OMEPRAZOLE 10 MG/1
10 CAPSULE, DELAYED RELEASE ORAL DAILY
COMMUNITY
End: 2020-01-16 | Stop reason: DRUGHIGH

## 2019-10-23 NOTE — PROGRESS NOTES
Written by Jon Honeycutt, as dictated by Lindsey Valenzuela, [address]. Name: Filemon Marmolejo       Age: 80 y.o. Date: 10/23/2019    Chief Complaint:   Chief Complaint   Patient presents with    Skin Exam       Subjective:    HPI  Ms. Filemon Marmolejo is a 80 y.o. female who presents for a partial skin exam.  The patient's last skin exam was on 19 and the patient does have current complaints related to her skin. She notes a scaly area on the left temporal hairline and a lesion on her back. She denies associated symptoms. She is feeling well and in her usual state of health today. She has no current illnesses, no other skin concerns. Her allergies, medications, medical, and social history are reviewed by me today. The patient's pertinent skin history includes : personal history of NMSC and AKs  -SCC, left lower medial leg, Mohs, 17     ROS: Constitutional: Negative. Dermatological : negative    Social History     Socioeconomic History    Marital status:      Spouse name: Not on file    Number of children: Not on file    Years of education: Not on file    Highest education level: Not on file   Occupational History    Not on file   Social Needs    Financial resource strain: Not on file    Food insecurity:     Worry: Not on file     Inability: Not on file    Transportation needs:     Medical: Not on file     Non-medical: Not on file   Tobacco Use    Smoking status: Former Smoker     Packs/day: 1.00     Years: 20.00     Pack years: 20.00     Types: Cigarettes     Last attempt to quit: 1971     Years since quittin.8    Smokeless tobacco: Never Used   Substance and Sexual Activity    Alcohol use:  Yes     Alcohol/week: 11.7 standard drinks     Types: 10 Glasses of wine, 4 Standard drinks or equivalent per week    Drug use: No    Sexual activity: Not on file   Lifestyle    Physical activity:     Days per week: Not on file     Minutes per session: Not on file    Stress: Not on file   Relationships    Social connections:     Talks on phone: Not on file     Gets together: Not on file     Attends Confucianism service: Not on file     Active member of club or organization: Not on file     Attends meetings of clubs or organizations: Not on file     Relationship status: Not on file    Intimate partner violence:     Fear of current or ex partner: Not on file     Emotionally abused: Not on file     Physically abused: Not on file     Forced sexual activity: Not on file   Other Topics Concern    Not on file   Social History Narrative    ** Merged History Encounter **            Family History   Problem Relation Age of Onset    Stroke Mother     Heart Disease Father         heart attack in 66's       Past Medical History:   Diagnosis Date    Calculus of kidney     Cancer ([facility] [address]     bladder    Environmental allergies     History of actinic keratoses     Sun-damaged skin     Sunburn, blistering        Past Surgical History:   Procedure Laterality Date    HX APPENDECTOMY      HX BLADDER REPAIR      HX GYN      tahbso    HX HEENT      tonsillectomy    HX HYSTERECTOMY      HX MOHS PROCEDURES  12/28/2017    SCC L lower medial leg by Dr. Ronny Porter      partial right hip replacement        Current Outpatient Medications   Medication Sig Dispense Refill    omeprazole (PRILOSEC) 10 mg capsule Take 10 mg by mouth daily.  atorvastatin (LIPITOR) 20 mg tablet TAKE ONE TABLET BY MOUTH EVERY NIGHT AT BEDTIME 30 Tab 11    losartan (COZAAR) 25 mg tablet Take 1 Tab by mouth daily. 90 Tab 3    Biotin 2,500 mcg cap Take  by mouth.  clopidogrel (PLAVIX) 75 mg tab TAKE 1 TABLET BY MOUTH EVERY DAY. 30 Tab 5    levothyroxine (SYNTHROID) 25 mcg tablet TAKE 1 TABLET BY MOUTH ONCE DAILY. 30 Tab 5    cyanocobalamin (VITAMIN B-12) 1,000 mcg tablet Take 1,000 mcg by mouth daily.       latanoprost (XALATAN) 0.005 % ophthalmic solution Administer 1 Drop to both eyes nightly.  loratadine-pseudoephedrine (CLARITIN-D 12 HOUR) 5-120 mg per tablet Take 1 Tab by mouth two (2) times daily as needed.  cephALEXin (KEFLEX) 500 mg capsule Take 1 Cap by mouth three (3) times daily. 21 Cap 0    aspirin delayed-release 81 mg tablet Take  by mouth daily.  famotidine (PEPCID) 20 mg tablet Take 20 mg by mouth two (2) times a day. Allergies   Allergen Reactions    Latex Rash    Lemon Unknown (comments)     Headache and close throat      Novocain [Procaine] Anaphylaxis     Caused her to faint         Objective:    Visit Vitals  /70 (BP 1 Location: Left arm, BP Patient Position: Sitting)   Pulse 69   Temp 97.3 °F (36.3 °C) (Oral)   Resp 16   Ht 5' 4\" (1.626 m)   Wt 130 lb (59 kg)   SpO2 97%   BMI 22.31 kg/m²       Amanda Cavazos is a 80 y.o. female who appears well and in no distress. She is awake, alert, and oriented. A skin examination was performed including her scalp, face (including eyelids), ears, neck, chest, back, abdomen, upper extremities (including digits/nails), breasts. There is a well-healed scar on the left lower medial leg without evidence of lesion recurrence. She has scattered waxy macules and keratotic papules consistent with seborrheic keratoses. She also has scattered red papules consistent with cherry angiomas. There are lentigines on sun exposed areas. She has pink intradermal nevi and brown junctional nevi, no concerning features for severe atypia. She has thin-scaled actinic keratoses on her right cheek and right chest x 2. Assessment/Plan:  1. Personal history of nonmelanoma skin cancer. I discussed sun protection, sunscreen use, the warning signs of skin cancer, the need for self-skin examinations, and the need for regular practitioner exams. The patient should follow up sooner as needed if new, changing, or symptomatic skin lesions arise. 2. Seborrheic keratoses.   The diagnosis was reviewed and the patient was reassured that no treatment is needed for these benign lesions. 3. Cherry angiomas. The diagnosis was reviewed and the patient was reassured that no treatment is needed for these benign lesions. 4. Solar lentigos. The diagnosis and relationship to sun exposure was reviewed. Sun protection advised. 5. Normal nevi. The diagnosis of normal nevi was reviewed. I discussed sun protection, sunscreen use, the warning signs of skin cancer, mole monitoring, the need for self-skin examinations, and the need for regular practitioner exams. The patient should follow up sooner as needed if new, changing, or symptomatic skin lesions arise. 6. Actinic Keratoses. The diagnosis of this precancerous lesion related to sun exposure was reviewed. Verbal consent was obtained. I treated 3 lesions with cryotherapy and post-cryotherapy care was reviewed. Next skin exam:  6 months    This plan was reviewed with the patient and patient agrees. All questions were answered. This scribe documentation was reviewed by me and accurately reflects the examination and decisions made by me. [facility]   OFFICE PROCEDURE PROGRESS NOTE   Chart reviewed for the following:   Kofi CUMMINS, have reviewed the History, Physical and updated the Allergic reactions for [facility]. TIME OUT performed immediately prior to start of procedure:   Tomeka CUMMINS, have performed the following reviews on [employer] [facility] [employer]   prior to the start of the procedure:     * Patient was identified by name and date of birth   * Agreement on procedure being performed was verified   * Risks and Benefits explained to the patient   * Procedure site verified and marked as necessary   * Patient was positioned for comfort   * Consent was signed and verified     Time: 11:40 AM   Date of procedure: 10/23/2019  Procedure performed by: Wai Mcintosh.  Juani Collazo  Provider assisted by: self Patient assisted by: self   How tolerated by patient: tolerated the procedure well with no complications   Comments: none

## 2019-10-23 NOTE — PROGRESS NOTES
Room: 6    Identified pt with two pt identifiers(name and ). Reviewed record in preparation for visit and have obtained necessary documentation. All patient medications has been reviewed. Chief Complaint   Patient presents with    Skin Exam       Health Maintenance Due   Topic    COLONOSCOPY     Shingrix Vaccine Age 50> (1 of 2)    GLAUCOMA SCREENING Q2Y     Pneumococcal 65+ years (1 of 2 - PCV13)    DTaP/Tdap/Td series (1 - Tdap)    Influenza Age 5 to Adult        Vitals:    10/23/19 1126   BP: 128/70   Pulse: 69   Resp: 16   Temp: 97.3 °F (36.3 °C)   TempSrc: Oral   SpO2: 97%   Weight: 59 kg (130 lb)   Height: 5' 4\" (1.626 m)   PainSc:   0 - No pain       1. Have you been to the ER, urgent care clinic since your last visit? Hospitalized since your last visit? No    2. Have you seen or consulted any other health care providers outside of the [address] [place] [address] since your last visit? Include any pap smears or colon screening. No    Patient is accompanied by self and adult caretaker I have received verbal consent from Deepika Rashid to discuss any/all medical information while they are present in the room.

## 2020-02-06 ENCOUNTER — OFFICE VISIT (OUTPATIENT)
Dept: DERMATOLOGY | Facility: AMBULATORY SURGERY CENTER | Age: 85
End: 2020-02-06

## 2020-02-06 VITALS
RESPIRATION RATE: 16 BRPM | TEMPERATURE: 98.2 F | OXYGEN SATURATION: 97 % | HEIGHT: 64 IN | DIASTOLIC BLOOD PRESSURE: 68 MMHG | HEART RATE: 72 BPM | BODY MASS INDEX: 22.31 KG/M2 | SYSTOLIC BLOOD PRESSURE: 122 MMHG

## 2020-02-06 DIAGNOSIS — L82.1 SEBORRHEIC KERATOSES: ICD-10-CM

## 2020-02-06 DIAGNOSIS — L82.0 INFLAMED SEBORRHEIC KERATOSIS: ICD-10-CM

## 2020-02-06 DIAGNOSIS — L57.0 ACTINIC KERATOSES: Primary | ICD-10-CM

## 2020-02-06 NOTE — PROGRESS NOTES
Written by Meryl Hopkins, as dictated by Kofi Jordan, [address]. Name: Yamileth Lao       Age: 80 y.o. Date: 2020    Chief Complaint:   Chief Complaint   Patient presents with    Skin Exam     Nasal bridge        Subjective:    HPI:  Ms.. Yamileth Lao is a 80 y.o. female who presents for the evaluation of a lesion on the left nasal dorsum that has existed for a couple of weeks. She denies any tenderness from this lesion. She is also aware of a scaly area on the right side of her chin- this is sensitive to touch. She is feeling well and in her usual state of health today. She has no current illnesses, no other skin concerns. Her allergies, medications, medical, and social history are reviewed by me today. ROS: Consitutional: Negative  Dermatological : positive for - skin lesion changes    Social History     Socioeconomic History    Marital status:      Spouse name: Not on file    Number of children: Not on file    Years of education: Not on file    Highest education level: Not on file   Occupational History    Not on file   Social Needs    Financial resource strain: Not on file    Food insecurity:     Worry: Not on file     Inability: Not on file    Transportation needs:     Medical: Not on file     Non-medical: Not on file   Tobacco Use    Smoking status: Former Smoker     Packs/day: 1.00     Years: 20.00     Pack years: 20.00     Types: Cigarettes     Last attempt to quit: 1971     Years since quittin.1    Smokeless tobacco: Never Used   Substance and Sexual Activity    Alcohol use:  Yes     Alcohol/week: 8.0 standard drinks     Types: 4 Standard drinks or equivalent, 4 Glasses of wine per week    Drug use: No    Sexual activity: Not on file   Lifestyle    Physical activity:     Days per week: Not on file     Minutes per session: Not on file    Stress: Not on file   Relationships    Social connections:     Talks on phone: Not on file Gets together: Not on file     Attends Orthodoxy service: Not on file     Active member of club or organization: Not on file     Attends meetings of clubs or organizations: Not on file     Relationship status: Not on file    Intimate partner violence:     Fear of current or ex partner: Not on file     Emotionally abused: Not on file     Physically abused: Not on file     Forced sexual activity: Not on file   Other Topics Concern    Not on file   Social History Narrative    ** Merged History Encounter **            Family History   Problem Relation Age of Onset    Stroke Mother     Heart Disease Father         heart attack in 66's       Past Medical History:   Diagnosis Date    Calculus of kidney     Cancer ([facility] [address]     bladder    Environmental allergies     History of actinic keratoses     Sun-damaged skin     Sunburn, blistering        Past Surgical History:   Procedure Laterality Date    HX APPENDECTOMY      HX BLADDER REPAIR      HX GYN      tahbso    HX HEENT      tonsillectomy    HX HYSTERECTOMY      HX MOHS PROCEDURES  12/28/2017    SCC L lower medial leg by Dr. Cosme Suazo      partial right hip replacement        Current Outpatient Medications   Medication Sig Dispense Refill    omeprazole (PRILOSEC) 40 mg capsule Take 1 Cap by mouth daily for 30 days. 30 Cap 4    clopidogrel (PLAVIX) 75 mg tab TAKE ONE TABLET BY MOUTH EVERY DAY 30 Tab 5    levothyroxine (SYNTHROID) 25 mcg tablet TAKE ONE TABLET BY MOUTH EVERY DAY 30 Tab 5    atorvastatin (LIPITOR) 20 mg tablet TAKE ONE TABLET BY MOUTH EVERY NIGHT AT BEDTIME 30 Tab 11    cephALEXin (KEFLEX) 500 mg capsule Take 1 Cap by mouth three (3) times daily. (Patient not taking: Reported on 10/23/2019) 21 Cap 0    losartan (COZAAR) 25 mg tablet Take 1 Tab by mouth daily. 90 Tab 3    Biotin 2,500 mcg cap Take  by mouth.  cyanocobalamin (VITAMIN B-12) 1,000 mcg tablet Take 1,000 mcg by mouth daily.       aspirin delayed-release 81 mg tablet Take  by mouth daily.  famotidine (PEPCID) 20 mg tablet Take 20 mg by mouth two (2) times a day.  latanoprost (XALATAN) 0.005 % ophthalmic solution Administer 1 Drop to both eyes nightly.  loratadine-pseudoephedrine (CLARITIN-D 12 HOUR) 5-120 mg per tablet Take 1 Tab by mouth two (2) times daily as needed. Allergies   Allergen Reactions    Latex Rash    Lemon Unknown (comments)     Headache and close throat      Novocain [Procaine] Anaphylaxis     Caused her to faint         Objective:    Visit Vitals  /68 (BP 1 Location: Left arm, BP Patient Position: Sitting)   Pulse 72   Temp 98.2 °F (36.8 °C) (Oral)   Resp 16   Ht 5' 4\" (1.626 m)   SpO2 97%   BMI 22.31 kg/m²       Dustin Barry is a 80 y.o. female who appears well and in no distress. She is awake, alert, and oriented. A limited skin examination was completed including the face. She has scattered waxy macules and keratotic papules consistent with seborrheic keratoses. She has inflamed seborrheic keratoses on the left nasal dorsum, including the lesion of her concern. She has thin scaled actinic keratoses on the right chin x 2. Photos from today's visit:     Nasal dorsum   Left chin    Assessment/Plan:  1. Seborrheic keratoses. The diagnosis was reviewed and the patient was reassured that no treatment is needed for these benign lesions. 2. Inflamed seborrheic keratoses. The diagnosis and treatment with liquid nitrogen cryotherapy were reviewed. The risk or persistence or recurrence of the keratosis and the potential for pigment change at the treated site were reviewed. Verbal consent was obtained. I treated 1 lesion with cryotherapy and care was reviewed.     3. Actinic Keratoses. The diagnosis of this precancerous lesion related to sun exposure was reviewed. Verbal consent was obtained. I treated 2 lesions with cryotherapy and post-cryotherapy care was reviewed.     Pt will return in 4 weeks to ensure resolution. This plan was reviewed with the patient and patient agrees. All questions were answered. This scribe documentation was reviewed by me and accurately reflects the examination and decisions made by me. [facility]   OFFICE PROCEDURE PROGRESS NOTE   Chart reviewed for the following:   Cheryl CUMMINS, have reviewed the History, Physical and updated the Allergic reactions for Silverio Burgess. TIME OUT performed immediately prior to start of procedure:   Tomeka CUMMINS, have performed the following reviews on [place]   prior to the start of the procedure:     * Patient was identified by name and date of birth   * Agreement on procedure being performed was verified   * Risks and Benefits explained to the patient   * Procedure site verified and marked as necessary   * Patient was positioned for comfort   * Consent was signed and verified     Time: 1:35 pm  Date of procedure: 2/6/2020  Procedure performed by: Dionne Silva.  Monica Mccall DNP  Provider assisted by: self   Patient assisted by: self   How tolerated by patient: tolerated the procedure well with no complications   Comments: none

## 2020-03-17 ENCOUNTER — TELEPHONE (OUTPATIENT)
Dept: DERMATOLOGY | Facility: AMBULATORY SURGERY CENTER | Age: 85
End: 2020-03-17

## 2021-07-15 ENCOUNTER — OFFICE VISIT (OUTPATIENT)
Dept: CARDIOLOGY CLINIC | Age: 86
End: 2021-07-15
Payer: COMMERCIAL

## 2021-07-15 VITALS
WEIGHT: 151 LBS | HEART RATE: 87 BPM | HEIGHT: 64 IN | BODY MASS INDEX: 25.78 KG/M2 | SYSTOLIC BLOOD PRESSURE: 120 MMHG | OXYGEN SATURATION: 95 % | DIASTOLIC BLOOD PRESSURE: 80 MMHG | RESPIRATION RATE: 14 BRPM

## 2021-07-15 DIAGNOSIS — E78.2 MIXED HYPERLIPIDEMIA: ICD-10-CM

## 2021-07-15 DIAGNOSIS — I10 ESSENTIAL HYPERTENSION: Primary | ICD-10-CM

## 2021-07-15 PROCEDURE — 99214 OFFICE O/P EST MOD 30 MIN: CPT | Performed by: SPECIALIST

## 2021-07-15 NOTE — PROGRESS NOTES
HISTORY OF PRESENT ILLNESS  Carmelita Sexton is a 80 y.o. female     SUMMARY:   Problem List  Date Reviewed: 7/15/2021        Codes Class Noted    Chest pain, unspecified ICD-10-CM: R07.9  ICD-9-CM: 786.50  12/27/2011        Calculus of kidney ICD-10-CM: N20.0  ICD-9-CM: 592.0  Unknown              Current Outpatient Medications on File Prior to Visit   Medication Sig    clopidogreL (PLAVIX) 75 mg tab TAKE ONE TABLET BY MOUTH EVERY DAY    levothyroxine (SYNTHROID) 25 mcg tablet TAKE 1 TABLET BY MOUTH EVERY DAY.  omeprazole (PRILOSEC) 40 mg capsule TAKE 1 CAPSULE BY MOUTH DAILY FOR 30 DAYS.  atorvastatin (LIPITOR) 20 mg tablet TAKE ONE TABLET BY MOUTH EVERY NIGHT AT BEDTIME    losartan (COZAAR) 25 mg tablet Take 1 Tab by mouth daily.  Biotin 2,500 mcg cap Take  by mouth.  cyanocobalamin (VITAMIN B-12) 1,000 mcg tablet Take 1,000 mcg by mouth daily.  latanoprost (XALATAN) 0.005 % ophthalmic solution Administer 1 Drop to both eyes nightly.  cephALEXin (KEFLEX) 500 mg capsule Take 1 Cap by mouth three (3) times daily. (Patient not taking: Reported on 7/15/2021)    famotidine (PEPCID) 40 mg tablet Take 1 Tab by mouth daily. (Patient not taking: Reported on 7/15/2021)    aspirin delayed-release 81 mg tablet Take  by mouth daily. (Patient not taking: Reported on 7/15/2021)    loratadine-pseudoephedrine (CLARITIN-D 12 HOUR) 5-120 mg per tablet Take 1 Tab by mouth two (2) times daily as needed. (Patient not taking: Reported on 7/15/2021)     No current facility-administered medications on file prior to visit. CARDIOLOGY STUDIES TO DATE:  No specialty comments available. Chief Complaint   Patient presents with    Follow-up     HPI :  She returns having not been seen for several years. 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   She does not get any regular exercise and she is gained a little weight. Her blood pressures well controlled and her primary care is following her lipids. CARDIAC ROS:   negative for chest pain, dyspnea, palpitations, syncope, orthopnea, paroxysmal nocturnal dyspnea, exertional chest pressure/discomfort, claudication, lower extremity edema    Family History   Problem Relation Age of Onset    Stroke Mother     Heart Disease Father         heart attack in 66's       Past Medical History:   Diagnosis Date    Calculus of kidney     Cancer ([facility] [address]     bladder    Environmental allergies     History of actinic keratoses     Sun-damaged skin     Sunburn, blistering        GENERAL ROS:  A comprehensive review of systems was negative except for that written in the HPI. Visit Vitals  /80   Pulse 87   Resp 14   Ht 5' 4\" (1.626 m)   Wt 151 lb (68.5 kg)   SpO2 95%   BMI 25.92 kg/m²       Wt Readings from Last 3 Encounters:   07/15/21 151 lb (68.5 kg)   01/16/20 130 lb (59 kg)   10/23/19 130 lb (59 kg)            BP Readings from Last 3 Encounters:   07/15/21 120/80   02/06/20 122/68   01/16/20 120/60       PHYSICAL EXAM  General appearance: alert, cooperative, no distress, appears stated age  Neurologic: Alert and oriented X 3  Neck: supple, symmetrical, trachea midline, no adenopathy, no carotid bruit and no JVD  Lungs: clear to auscultation bilaterally  Heart: regular rate and rhythm, S1, S2 normal, no murmur, click, rub or gallop  Extremities: extremities normal, atraumatic, no cyanosis or edema      ASSESSMENT :      She is stable and appears to be well compensated on a good medical regimen and needs no cardiac testing at this time. current treatment plan is effective, no change in therapy  lab results and schedule of future lab studies reviewed with patient  reviewed diet, exercise and weight control    Encounter Diagnoses   Name Primary?     Essential hypertension Yes    Mixed hyperlipidemia      No orders of the defined types were placed in this encounter. Follow-up and Dispositions    · Return in about 1 year (around 7/15/2022). Dank Baer MD  7/15/2021  Please note that this dictation was completed with [employer], the computer voice recognition software. Quite often unanticipated grammatical, syntax, homophones, and other interpretive errors are inadvertently transcribed by the computer software. Please disregard these errors. Please excuse any errors that have escaped final proofreading. Thank you.

## 2022-07-15 ENCOUNTER — OFFICE VISIT (OUTPATIENT)
Dept: CARDIOLOGY CLINIC | Age: 87
End: 2022-07-15
Payer: MEDICARE

## 2022-07-15 VITALS
OXYGEN SATURATION: 98 % | BODY MASS INDEX: 26.12 KG/M2 | SYSTOLIC BLOOD PRESSURE: 130 MMHG | DIASTOLIC BLOOD PRESSURE: 88 MMHG | RESPIRATION RATE: 18 BRPM | HEIGHT: 64 IN | HEART RATE: 68 BPM | WEIGHT: 153 LBS

## 2022-07-15 DIAGNOSIS — E78.2 MIXED HYPERLIPIDEMIA: Primary | ICD-10-CM

## 2022-07-15 DIAGNOSIS — I10 ESSENTIAL HYPERTENSION: ICD-10-CM

## 2022-07-15 PROCEDURE — G8536 NO DOC ELDER MAL SCRN: HCPCS | Performed by: SPECIALIST

## 2022-07-15 PROCEDURE — 1123F ACP DISCUSS/DSCN MKR DOCD: CPT | Performed by: SPECIALIST

## 2022-07-15 PROCEDURE — 1101F PT FALLS ASSESS-DOCD LE1/YR: CPT | Performed by: SPECIALIST

## 2022-07-15 PROCEDURE — G8427 DOCREV CUR MEDS BY ELIG CLIN: HCPCS | Performed by: SPECIALIST

## 2022-07-15 PROCEDURE — 99213 OFFICE O/P EST LOW 20 MIN: CPT | Performed by: SPECIALIST

## 2022-07-15 PROCEDURE — G0463 HOSPITAL OUTPT CLINIC VISIT: HCPCS | Performed by: SPECIALIST

## 2022-07-15 PROCEDURE — 1090F PRES/ABSN URINE INCON ASSESS: CPT | Performed by: SPECIALIST

## 2022-07-15 PROCEDURE — G8417 CALC BMI ABV UP PARAM F/U: HCPCS | Performed by: SPECIALIST

## 2022-07-15 PROCEDURE — G8510 SCR DEP NEG, NO PLAN REQD: HCPCS | Performed by: SPECIALIST

## 2022-07-15 RX ORDER — CEFUROXIME AXETIL 250 MG/1
TABLET ORAL
COMMUNITY
Start: 2022-06-30 | End: 2022-07-15 | Stop reason: ALTCHOICE

## 2022-07-15 RX ORDER — TIMOLOL MALEATE 2.5 MG/ML
SOLUTION/ DROPS OPHTHALMIC
COMMUNITY
Start: 2022-05-15

## 2022-07-15 NOTE — PROGRESS NOTES
HISTORY OF PRESENT ILLNESS  [place] is a 80 y.o. female     SUMMARY:   Problem List  Date Reviewed: 7/15/2022          Codes Class Noted    Chest pain, unspecified ICD-10-CM: R07.9  ICD-9-CM: 786.50  12/27/2011        Calculus of kidney ICD-10-CM: N20.0  ICD-9-CM: 592.0  Unknown              Current Outpatient Medications on File Prior to Visit   Medication Sig    timolol (TIMOPTIC) 0.25 % ophthalmic solution     clopidogreL (PLAVIX) 75 mg tab TAKE ONE TABLET BY MOUTH EVERY DAY    omeprazole (PRILOSEC) 40 mg capsule TAKE 1 CAPSULE BY MOUTH DAILY FOR 30 DAYS.  levothyroxine (SYNTHROID) 25 mcg tablet Take 1 Tablet by mouth daily.  atorvastatin (LIPITOR) 20 mg tablet TAKE ONE TABLET BY MOUTH EVERY NIGHT AT BEDTIME    latanoprost (XALATAN) 0.005 % ophthalmic solution PLACE 1 DROP IN BOTH EYES AT NIGHT    Bifidobacterium Infantis (Align) 4 mg cap Take 1 Capsule by mouth daily.  famotidine (PEPCID) 40 mg tablet Take 1 Tab by mouth daily.  Biotin 2,500 mcg cap Take  by mouth.  cyanocobalamin (VITAMIN B-12) 1,000 mcg tablet Take 1,000 mcg by mouth daily.  aspirin delayed-release 81 mg tablet Take  by mouth daily. (Patient not taking: Reported on 7/15/2021)    loratadine-pseudoephedrine (CLARITIN-D 12 HOUR) 5-120 mg per tablet Take 1 Tab by mouth two (2) times daily as needed. (Patient not taking: Reported on 7/15/2021)     No current facility-administered medications on file prior to visit. CARDIOLOGY STUDIES TO DATE:  No specialty comments available. Chief Complaint   Patient presents with    Follow-up     HPI :  She is doing well with no cardiac complaints or problems with her medications. Primary care is following her lipids. She still has pretty decreased mobility from a stroke with some right-sided weakness but in spite of that she is going to [facility] and [facility] [place] of [place] [facility].   She had a minor fall in the bathroom a couple of weeks ago. Her appetite is good and her weight is stable  CARDIAC ROS:   negative for chest pain, dyspnea, palpitations, syncope, orthopnea, paroxysmal nocturnal dyspnea, exertional chest pressure/discomfort, claudication, lower extremity edema    Family History   Problem Relation Age of Onset    Stroke Mother     Heart Disease Father         heart attack in 66's       Past Medical History:   Diagnosis Date    Calculus of kidney     Cancer ([facility] [address]     bladder    Environmental allergies     History of actinic keratoses     Sun-damaged skin     Sunburn, blistering        GENERAL ROS:  A comprehensive review of systems was negative except for that written in the HPI. Visit Vitals  /88 (BP 1 Location: Left upper arm, BP Patient Position: Sitting, BP Cuff Size: Adult)   Pulse 68   Resp 18   Ht 5' 4\" (1.626 m)   Wt 153 lb (69.4 kg)   SpO2 98%   BMI 26.26 kg/m²       Wt Readings from Last 3 Encounters:   07/15/22 153 lb (69.4 kg)   12/07/21 154 lb (69.9 kg)   07/15/21 151 lb (68.5 kg)            BP Readings from Last 3 Encounters:   07/15/22 130/88   12/07/21 130/80   07/15/21 120/80       PHYSICAL EXAM  General appearance: alert, cooperative, no distress, appears stated age  Neurologic: Alert and oriented X 3  Neck: supple, symmetrical, trachea midline, no adenopathy, no carotid bruit and no JVD  Lungs: clear to auscultation bilaterally  Heart: regular rate and rhythm, S1, S2 normal, no murmur, click, rub or gallop  Extremities: extremities normal, atraumatic, no cyanosis or edema      ASSESSMENT :      She is stable and asymptomatic at this point on a reasonable medical regimen and needs no cardiac testing. current treatment plan is effective, no change in therapy  lab results and schedule of future lab studies reviewed with patient  reviewed diet, exercise and weight control    Encounter Diagnoses   Name Primary?     Mixed hyperlipidemia Yes    Essential hypertension      Orders Placed This Encounter    timolol (TIMOPTIC) 0.25 % ophthalmic solution    DISCONTD: cefUROXime (CEFTIN) 250 mg tablet       Follow-up and Dispositions    · Return in about 1 year (around 7/15/2023). Catrachito Acevedo MD  7/15/2022  Please note that this dictation was completed with [employer], the computer voice recognition software. Quite often unanticipated grammatical, syntax, homophones, and other interpretive errors are inadvertently transcribed by the computer software. Please disregard these errors. Please excuse any errors that have escaped final proofreading. Thank you.

## 2022-11-02 PROBLEM — D69.2 SENILE PURPURA (HCC): Status: ACTIVE | Noted: 2022-11-02

## 2022-11-02 PROBLEM — G81.91 RIGHT HEMIPARESIS (HCC): Status: ACTIVE | Noted: 2022-11-02

## 2022-11-02 PROBLEM — D69.2 SENILE PURPURA: Status: ACTIVE | Noted: 2022-11-02

## 2023-02-26 ENCOUNTER — APPOINTMENT (OUTPATIENT)
Dept: GENERAL RADIOLOGY | Age: 88
End: 2023-02-26
Attending: EMERGENCY MEDICINE
Payer: MEDICARE

## 2023-02-26 ENCOUNTER — APPOINTMENT (OUTPATIENT)
Dept: CT IMAGING | Age: 88
End: 2023-02-26
Attending: EMERGENCY MEDICINE
Payer: MEDICARE

## 2023-02-26 ENCOUNTER — HOSPITAL ENCOUNTER (EMERGENCY)
Age: 88
Discharge: HOME | End: 2023-02-26
Attending: EMERGENCY MEDICINE
Payer: MEDICARE

## 2023-02-26 VITALS
SYSTOLIC BLOOD PRESSURE: 147 MMHG | HEART RATE: 64 BPM | TEMPERATURE: 97.8 F | RESPIRATION RATE: 18 BRPM | OXYGEN SATURATION: 95 % | DIASTOLIC BLOOD PRESSURE: 87 MMHG

## 2023-02-26 DIAGNOSIS — N30.00 ACUTE CYSTITIS WITHOUT HEMATURIA: ICD-10-CM

## 2023-02-26 DIAGNOSIS — R55 SYNCOPE, NEAR: Primary | ICD-10-CM

## 2023-02-26 LAB
ALBUMIN SERPL-MCNC: 4.1 G/DL (ref 3.5–5)
ALBUMIN/GLOB SERPL: 1.2 (ref 1.1–2.2)
ALP SERPL-CCNC: 69 U/L (ref 45–117)
ALT SERPL-CCNC: 29 U/L (ref 12–78)
ANION GAP SERPL CALC-SCNC: 8 MMOL/L (ref 5–15)
APPEARANCE UR: ABNORMAL
AST SERPL-CCNC: 29 U/L (ref 15–37)
BACTERIA URNS QL MICRO: ABNORMAL /HPF
BASOPHILS # BLD: 0.1 K/UL (ref 0–0.1)
BASOPHILS NFR BLD: 1 % (ref 0–1)
BILIRUB SERPL-MCNC: 0.4 MG/DL (ref 0.2–1)
BILIRUB UR QL: NEGATIVE
BUN SERPL-MCNC: 22 MG/DL (ref 6–20)
BUN/CREAT SERPL: 19 (ref 12–20)
CALCIUM SERPL-MCNC: 9.6 MG/DL (ref 8.5–10.1)
CHLORIDE SERPL-SCNC: 105 MMOL/L (ref 97–108)
CO2 SERPL-SCNC: 25 MMOL/L (ref 21–32)
COLOR UR: ABNORMAL
COMMENT, HOLDF: NORMAL
CREAT SERPL-MCNC: 1.13 MG/DL (ref 0.55–1.02)
DIFFERENTIAL METHOD BLD: ABNORMAL
EOSINOPHIL # BLD: 0.5 K/UL (ref 0–0.4)
EOSINOPHIL NFR BLD: 5 % (ref 0–7)
EPITH CASTS URNS QL MICRO: ABNORMAL /LPF
ERYTHROCYTE [DISTWIDTH] IN BLOOD BY AUTOMATED COUNT: 13.2 % (ref 11.5–14.5)
GLOBULIN SER CALC-MCNC: 3.5 G/DL (ref 2–4)
GLUCOSE SERPL-MCNC: 111 MG/DL (ref 65–100)
GLUCOSE UR STRIP.AUTO-MCNC: NEGATIVE MG/DL
HCT VFR BLD AUTO: 39.4 % (ref 35–47)
HGB BLD-MCNC: 13.1 G/DL (ref 11.5–16)
HGB UR QL STRIP: NEGATIVE
HYALINE CASTS URNS QL MICRO: ABNORMAL /LPF (ref 0–5)
IMM GRANULOCYTES # BLD AUTO: 0.1 K/UL (ref 0–0.04)
IMM GRANULOCYTES NFR BLD AUTO: 1 % (ref 0–0.5)
KETONES UR QL STRIP.AUTO: NEGATIVE MG/DL
LEUKOCYTE ESTERASE UR QL STRIP.AUTO: ABNORMAL
LYMPHOCYTES # BLD: 2.7 K/UL (ref 0.8–3.5)
LYMPHOCYTES NFR BLD: 27 % (ref 12–49)
MCH RBC QN AUTO: 31.8 PG (ref 26–34)
MCHC RBC AUTO-ENTMCNC: 33.2 G/DL (ref 30–36.5)
MCV RBC AUTO: 95.6 FL (ref 80–99)
MONOCYTES # BLD: 1.1 K/UL (ref 0–1)
MONOCYTES NFR BLD: 11 % (ref 5–13)
NEUTS SEG # BLD: 5.5 K/UL (ref 1.8–8)
NEUTS SEG NFR BLD: 55 % (ref 32–75)
NITRITE UR QL STRIP.AUTO: POSITIVE
NRBC # BLD: 0 K/UL (ref 0–0.01)
NRBC BLD-RTO: 0 PER 100 WBC
PH UR STRIP: 5.5 (ref 5–8)
PLATELET # BLD AUTO: 190 K/UL (ref 150–400)
PMV BLD AUTO: 10.6 FL (ref 8.9–12.9)
POTASSIUM SERPL-SCNC: 4.1 MMOL/L (ref 3.5–5.1)
PROT SERPL-MCNC: 7.6 G/DL (ref 6.4–8.2)
PROT UR STRIP-MCNC: NEGATIVE MG/DL
RBC # BLD AUTO: 4.12 M/UL (ref 3.8–5.2)
RBC #/AREA URNS HPF: ABNORMAL /HPF (ref 0–5)
SAMPLES BEING HELD,HOLD: NORMAL
SODIUM SERPL-SCNC: 138 MMOL/L (ref 136–145)
SP GR UR REFRACTOMETRY: 1.01 (ref 1–1.03)
TROPONIN I SERPL HS-MCNC: 3 NG/L (ref 0–37)
UR CULT HOLD, URHOLD: NORMAL
UROBILINOGEN UR QL STRIP.AUTO: 0.2 EU/DL (ref 0.2–1)
WBC # BLD AUTO: 9.9 K/UL (ref 3.6–11)
WBC URNS QL MICRO: ABNORMAL /HPF (ref 0–4)

## 2023-02-26 PROCEDURE — 80053 COMPREHEN METABOLIC PANEL: CPT

## 2023-02-26 PROCEDURE — 93005 ELECTROCARDIOGRAM TRACING: CPT

## 2023-02-26 PROCEDURE — 84484 ASSAY OF TROPONIN QUANT: CPT

## 2023-02-26 PROCEDURE — 36415 COLL VENOUS BLD VENIPUNCTURE: CPT

## 2023-02-26 PROCEDURE — 96374 THER/PROPH/DIAG INJ IV PUSH: CPT

## 2023-02-26 PROCEDURE — 70450 CT HEAD/BRAIN W/O DYE: CPT

## 2023-02-26 PROCEDURE — 74011250636 HC RX REV CODE- 250/636: Performed by: EMERGENCY MEDICINE

## 2023-02-26 PROCEDURE — 85025 COMPLETE CBC W/AUTO DIFF WBC: CPT

## 2023-02-26 PROCEDURE — 81001 URINALYSIS AUTO W/SCOPE: CPT

## 2023-02-26 PROCEDURE — 99285 EMERGENCY DEPT VISIT HI MDM: CPT

## 2023-02-26 PROCEDURE — 71046 X-RAY EXAM CHEST 2 VIEWS: CPT

## 2023-02-26 PROCEDURE — 74011000250 HC RX REV CODE- 250: Performed by: EMERGENCY MEDICINE

## 2023-02-26 RX ORDER — CEPHALEXIN 500 MG/1
500 CAPSULE ORAL 3 TIMES DAILY
Qty: 15 CAPSULE | Refills: 0 | Status: SHIPPED | OUTPATIENT
Start: 2023-02-26 | End: 2023-03-03

## 2023-02-26 RX ADMIN — CEFTRIAXONE SODIUM 1 G: 1 INJECTION, POWDER, FOR SOLUTION INTRAMUSCULAR; INTRAVENOUS at 22:37

## 2023-02-26 NOTE — ED TRIAGE NOTES
Patient in through ems with complaints of a syncopal episode. EMS reports that she had gone up to go to the restroom and after the fact she sat down and went unresponsive for about 5 minutes. Patient is now at baseline at a/ox3. Pt has a hx of UTI, she recently finished a round of antibiotics. Pt also has a hx of stroke/tia. [place] negative, van negative enroute.

## 2023-02-27 LAB
ATRIAL RATE: 72 BPM
CALCULATED P AXIS, ECG09: 47 DEGREES
CALCULATED R AXIS, ECG10: 67 DEGREES
CALCULATED T AXIS, ECG11: 66 DEGREES
DIAGNOSIS, 93000: NORMAL
P-R INTERVAL, ECG05: 184 MS
Q-T INTERVAL, ECG07: 400 MS
QRS DURATION, ECG06: 82 MS
QTC CALCULATION (BEZET), ECG08: 438 MS
VENTRICULAR RATE, ECG03: 72 BPM

## 2023-02-27 NOTE — ED PROVIDER NOTES
Patient is a 66-year-old female with history of nephrolithiasis, bladder cancer, environmental allergies, actinic keratosis who presents with presyncope while using the bathroom that was witnessed by family and her home nurse. Patient along with  and son are at bedside. They report there is no concern for altered mental status today. She is awake, alert, oriented and says she is ready to go. She has no complaints today. Son says that her home nurse was with her in the restroom, when she was having a bowel movement and then says she did not feel well and slumped over. She did not actually lose consciousness, there was no seizure activity noted. For few minutes she was not able to answer any questions, but her eyes were open. EMS arrived, there was no concern for any focal weakness, no prior history of strokes. She had a UTI 3 weeks ago, and completed Cipro per her PCP Dr. Harry Amezquita.        Past Medical History:   Diagnosis Date    Calculus of kidney     Cancer (HCC)     bladder    Environmental allergies     History of actinic keratoses     Sun-damaged skin     Sunburn, blistering        Past Surgical History:   Procedure Laterality Date    HX APPENDECTOMY      HX BLADDER REPAIR      HX GYN      tahbso    HX HEENT      tonsillectomy    HX HYSTERECTOMY      HX MOHS PROCEDURES  12/28/2017    SCC L lower medial leg by Dr. Mckeon Medic[facility]      partial right hip replacement          Family History:   Problem Relation Age of Onset    Stroke Mother     Heart Disease Father         heart attack in 66's       Social History     Socioeconomic History    Marital status:      Spouse name: Not on file    Number of children: Not on file    Years of education: Not on file    Highest education level: Not on file   Occupational History    Not on file   Tobacco Use    Smoking status: Former     Packs/day: 1.00     Years: 20.00     Pack years: 20.00     Types: Cigarettes     Quit date: 12/27/1971 Years since quittin.2    Smokeless tobacco: Never   Substance and Sexual Activity    Alcohol use: Yes     Alcohol/week: 7.0 standard drinks     Types: 7 Glasses of wine per week     Comment: 1 a day    Drug use: No    Sexual activity: Not on file   Other Topics Concern    Not on file   Social History Narrative    ** Merged History Encounter **          Social Determinants of Health     Financial Resource Strain: Not on file   Food Insecurity: Not on file   Transportation Needs: Not on file   Physical Activity: Not on file   Stress: Not on file   Social Connections: Not on file   Intimate Partner Violence: Not on file   Housing Stability: Not on file         ALLERGIES: Latex, Lemon, and Novocain [procaine]    Review of Systems   Constitutional:  Negative for chills and fever. HENT:  Negative for drooling and nosebleeds. Eyes:  Negative for pain and itching. Respiratory:  Negative for choking and stridor. Cardiovascular:  Negative for leg swelling. Gastrointestinal:  Negative for abdominal distention and rectal pain. Endocrine: Negative for heat intolerance and polyphagia. Genitourinary:  Negative for enuresis and genital sores. Musculoskeletal:  Negative for arthralgias and joint swelling. Skin:  Negative for color change. Allergic/Immunologic: Negative for immunocompromised state. Neurological:  Negative for tremors and speech difficulty. Hematological:  Negative for adenopathy. Psychiatric/Behavioral:  Negative for dysphoric mood and sleep disturbance. Vitals:    23 1814   BP: 132/84   Pulse: 70   Resp: 18   Temp: 97.1 °F (36.2 °C)   SpO2: 97%            Physical Exam  Vitals and nursing note reviewed. Constitutional:       General: She is not in acute distress. Appearance: She is well-developed. She is not ill-appearing, toxic-appearing or diaphoretic. HENT:      Head: Normocephalic and atraumatic.       Nose: Nose normal.   Eyes:      Conjunctiva/sclera: Conjunctivae normal.   Cardiovascular:      Rate and Rhythm: Normal rate and regular rhythm. Pulmonary:      Effort: Pulmonary effort is normal. No respiratory distress. Abdominal:      General: There is no distension. Palpations: Abdomen is soft. Musculoskeletal:         General: No deformity. Normal range of motion. Cervical back: Normal range of motion and neck supple. Right lower leg: No edema. Left lower leg: No edema. Skin:     General: Skin is warm and dry. Neurological:      Mental Status: She is alert. Mental status is at baseline. Coordination: Coordination normal.   Psychiatric:         Behavior: Behavior normal.        Medical Decision Making  Differential diagnosis includes acute intracranial abnormality, syncope, arrhythmia, urinary tract infection. Patient is awake, alert, oriented, would like to go home. Has no complaints at this time. Family is at bedside who reports that patient is at her baseline. Offered admission, but patient is not interested, they report that they have 24-hour nursing care available at home and will be monitored when she goes home tonight. Likely vasovagal episode during bowel movement, no report of any seizure activity or focal weakness. CT scan and chest x-ray reviewed, and radiology read noted. Discussed with family, and all questions answered. Will prescribe Keflex for UTI, and they will follow-up with PCP on Monday for symptom recheck. Stable for discharge. Amount and/or Complexity of Data Reviewed  Independent Historian: spouse     Details: son  Labs: ordered. Decision-making details documented in ED Course. Radiology: ordered and independent interpretation performed. Decision-making details documented in ED Course. ECG/medicine tests: ordered. Risk  Prescription drug management. Decision regarding hospitalization. ED Course as of 02/26/23 2239   [facility] Feb 26, 2023 2234 Pt feels well.  Did not syncopize but felt lightheaded and slumped over. Was with her RN and  who witnessed episode and deny LOC. No concern for AMS at this time. Son at bedside who says she gets frequent UTIs and will start pt on abx. They will FU w/Dr Harry Amezquita on Monday. Offered admission but pt would like to go home. They have 24 hour nursing care at home.  [AL]      ED Course User Index  [AL] Josh Wing MD       Procedures    Patient's results have been reviewed with them. Patient and/or family have verbally conveyed their understanding and agreement of the patient's signs, symptoms, diagnosis, treatment and prognosis and additionally agree to follow up as recommended or return to the Emergency Room should their condition change prior to follow-up. Discharge instructions have also been provided to the patient with some educational information regarding their diagnosis as well a list of reasons why they would want to return to the ER prior to their follow-up appointment should their condition change.

## 2023-02-27 NOTE — DISCHARGE INSTRUCTIONS
Your head CT did not show any abnormalities. Your blood LOC was within normal limits, but you noted to have a urinary tract infection. You were given a dose of antibiotics in the ED. Please start taking Keflex that was prescribed, and follow-up with Dr. Ayah Lujan on Monday for symptom recheck. Thank you.

## 2023-02-27 NOTE — ED NOTES
I have reviewed discharge instructions with the patient, spouse and caregiver. The patient, spouse and caregiver verbalized understanding.

## 2023-07-05 PROBLEM — I69.151: Status: ACTIVE | Noted: 2023-07-05

## 2025-06-26 ENCOUNTER — OFFICE VISIT (OUTPATIENT)
Age: 89
End: 2025-06-26
Payer: MEDICARE

## 2025-06-26 VITALS
HEIGHT: 64 IN | HEART RATE: 62 BPM | DIASTOLIC BLOOD PRESSURE: 80 MMHG | WEIGHT: 138 LBS | SYSTOLIC BLOOD PRESSURE: 120 MMHG | BODY MASS INDEX: 23.56 KG/M2 | OXYGEN SATURATION: 100 %

## 2025-06-26 DIAGNOSIS — E78.2 MIXED HYPERLIPIDEMIA: ICD-10-CM

## 2025-06-26 DIAGNOSIS — I63.9 CEREBROVASCULAR ACCIDENT (CVA), UNSPECIFIED MECHANISM (HCC): Primary | ICD-10-CM

## 2025-06-26 DIAGNOSIS — R60.0 BILATERAL LEG EDEMA: ICD-10-CM

## 2025-06-26 PROCEDURE — 1159F MED LIST DOCD IN RCRD: CPT

## 2025-06-26 PROCEDURE — 99214 OFFICE O/P EST MOD 30 MIN: CPT

## 2025-06-26 PROCEDURE — 1123F ACP DISCUSS/DSCN MKR DOCD: CPT

## 2025-06-26 PROCEDURE — 93005 ELECTROCARDIOGRAM TRACING: CPT

## 2025-06-26 PROCEDURE — 1090F PRES/ABSN URINE INCON ASSESS: CPT

## 2025-06-26 PROCEDURE — G8420 CALC BMI NORM PARAMETERS: HCPCS

## 2025-06-26 PROCEDURE — 93010 ELECTROCARDIOGRAM REPORT: CPT

## 2025-06-26 PROCEDURE — 1036F TOBACCO NON-USER: CPT

## 2025-06-26 PROCEDURE — 1126F AMNT PAIN NOTED NONE PRSNT: CPT

## 2025-06-26 PROCEDURE — G8427 DOCREV CUR MEDS BY ELIG CLIN: HCPCS

## 2025-06-26 RX ORDER — BRIMONIDINE TARTRATE 2 MG/ML
SOLUTION/ DROPS OPHTHALMIC
COMMUNITY

## 2025-06-26 RX ORDER — ATROPINE SULFATE 10 MG/ML
SOLUTION/ DROPS OPHTHALMIC
COMMUNITY

## 2025-06-26 RX ORDER — PREDNISOLONE ACETATE 10 MG/ML
SUSPENSION/ DROPS OPHTHALMIC
COMMUNITY

## 2025-06-26 RX ORDER — NITROFURANTOIN MACROCRYSTALS 50 MG/1
50 CAPSULE ORAL EVERY EVENING
COMMUNITY
Start: 2025-01-14

## 2025-06-26 RX ORDER — NETARSUDIL 0.2 MG/ML
SOLUTION/ DROPS OPHTHALMIC; TOPICAL
COMMUNITY

## 2025-06-26 RX ORDER — DORZOLAMIDE HYDROCHLORIDE AND TIMOLOL MALEATE 20; 5 MG/ML; MG/ML
SOLUTION/ DROPS OPHTHALMIC
COMMUNITY
Start: 2025-05-12

## 2025-06-26 ASSESSMENT — PATIENT HEALTH QUESTIONNAIRE - PHQ9
SUM OF ALL RESPONSES TO PHQ QUESTIONS 1-9: 0
SUM OF ALL RESPONSES TO PHQ QUESTIONS 1-9: 0
1. LITTLE INTEREST OR PLEASURE IN DOING THINGS: NOT AT ALL
SUM OF ALL RESPONSES TO PHQ QUESTIONS 1-9: 0
SUM OF ALL RESPONSES TO PHQ QUESTIONS 1-9: 0
2. FEELING DOWN, DEPRESSED OR HOPELESS: NOT AT ALL

## 2025-06-26 NOTE — PROGRESS NOTES
Patient: Malena Little  : 1932    Primary Cardiologist:Dr. MARIBETH Nash  EP Cardiologist:NONE   PCP: Tremayne Jasso MD    Today's Date: 2025      ASSESSMENT AND PLAN:     Assessment and Plan:    LE edema  EKG NSR  L>R edema mild pitting  No history of HF, lungs clear, denies any SOB  Suspect venous insufficiency  Advised conservative if possible with elevation, low salt diet, compression stockings difficult with fragile skin  She has an open area on left inner calf area and I have advised them to see PCP ASAP for wound care.   Does not appear to have infection but more venous stasis ulcer.   Denies redness, fever, warmth  Will check echo to be sure no HF contributing    HLD  Lipitor  Per PCP    CVA   With hemiplegia    Follow up with NP in 1 month      ICD-10-CM    1. Cerebrovascular accident (CVA), unspecified mechanism (HCC)  I63.9 EKG 12 Lead      2. Mixed hyperlipidemia  E78.2 EKG 12 Lead      3. Bilateral leg edema  R60.0 EKG 12 Lead     ECHO COMPLETE ADULT (TTE) W OR WO CONTR- Clinic Performed            HISTORY OF PRESENT ILLNESS:     History of Present Illness:  Malena Little is a 89 y.o. female     She last saw Dr. Nash in 2022. Per his note: She is doing well with no cardiac complaints or problems with her medications. Primary care is following her lipids. She still has pretty decreased mobility from a stroke with some right-sided weakness but in spite of that she is going to therapy at [facility] 1 day a week and [place] of [place]. She had a minor fall in the bathroom a couple of weeks ago. Her appetite is good and her weight is stable     Today...    Here with daughter today with new onset of LE edema. No history of HF. She has not been seen here in a few years and is closely followed by Dr. Jasso. Has L>R edema. Mild on right, left with +1 edema and open area on inner left calf area that looks more in keeping with venous